# Patient Record
Sex: MALE | Race: WHITE | NOT HISPANIC OR LATINO | Employment: OTHER | URBAN - METROPOLITAN AREA
[De-identification: names, ages, dates, MRNs, and addresses within clinical notes are randomized per-mention and may not be internally consistent; named-entity substitution may affect disease eponyms.]

---

## 2023-03-26 DIAGNOSIS — E11.9 TYPE 2 DIABETES MELLITUS WITHOUT COMPLICATION, WITHOUT LONG-TERM CURRENT USE OF INSULIN (MULTI): Primary | ICD-10-CM

## 2023-03-27 RX ORDER — DAPAGLIFLOZIN 10 MG/1
TABLET, FILM COATED ORAL
Qty: 30 TABLET | Refills: 7 | Status: SHIPPED | OUTPATIENT
Start: 2023-03-27 | End: 2023-11-14

## 2023-05-09 DIAGNOSIS — E11.65 UNCONTROLLED TYPE 2 DIABETES MELLITUS WITH HYPERGLYCEMIA (MULTI): ICD-10-CM

## 2023-05-09 RX ORDER — METFORMIN HYDROCHLORIDE 500 MG/1
TABLET ORAL
Qty: 180 TABLET | Refills: 2 | Status: SHIPPED | OUTPATIENT
Start: 2023-05-09 | End: 2024-02-13 | Stop reason: SDUPTHER

## 2023-05-26 ENCOUNTER — APPOINTMENT (OUTPATIENT)
Dept: PRIMARY CARE | Facility: CLINIC | Age: 68
End: 2023-05-26
Payer: COMMERCIAL

## 2023-05-31 PROBLEM — R76.8 POSITIVE ANA (ANTINUCLEAR ANTIBODY): Status: ACTIVE | Noted: 2023-05-31

## 2023-05-31 PROBLEM — E78.2 MIXED HYPERLIPIDEMIA: Status: ACTIVE | Noted: 2023-05-31

## 2023-05-31 PROBLEM — I49.3 PVC (PREMATURE VENTRICULAR CONTRACTION): Status: ACTIVE | Noted: 2023-05-31

## 2023-05-31 RX ORDER — ASPIRIN 81 MG/1
81 TABLET ORAL DAILY
COMMUNITY

## 2023-05-31 RX ORDER — ROSUVASTATIN CALCIUM 5 MG/1
5 TABLET, COATED ORAL DAILY
COMMUNITY
End: 2023-08-28

## 2023-05-31 ASSESSMENT — ENCOUNTER SYMPTOMS
CHILLS: 0
FREQUENCY: 0
NAUSEA: 0
WHEEZING: 0
BRUISES/BLEEDS EASILY: 0
DIZZINESS: 0
POLYPHAGIA: 0
ARTHRALGIAS: 0
HEADACHES: 0
CONSTIPATION: 0
COUGH: 0
PALPITATIONS: 0
ABDOMINAL PAIN: 0
ADENOPATHY: 0
SHORTNESS OF BREATH: 0
FEVER: 0
FATIGUE: 0
BLOOD IN STOOL: 0
EYE REDNESS: 0
DYSPHORIC MOOD: 0
EYE DISCHARGE: 0
VOMITING: 0
DIARRHEA: 0
SORE THROAT: 0
POLYDIPSIA: 0
NERVOUS/ANXIOUS: 0
DYSURIA: 0
HEMATURIA: 0
RHINORRHEA: 0
MYALGIAS: 0

## 2023-05-31 NOTE — PROGRESS NOTES
Subjective   Patient ID: Reji Ingram is a 67 y.o. male who presents for medicarewellness (Patient is fasting/Does not see any outside  providers./Bp at home 117/73/Doesn't know last a1c/Doesn't know when last microalbumin was/Doesn't take sugar /Last eye doctor appointment 1 year ago/Last dentist appointment  a little over a year/Pt doesn't have pneumonia shot///).    This is a medicare wellness appt; please ask all medicare questions  Is pt fasting? yes  Does pt see any providers other than care team below: no  Azam flores    Bps at home 100s/70s  Last a1c 2/2023  Last microalbumin 2/2023  Sugars am-not checking  Sugars 2hrs pp-not checking  Last eye dr 1yr ago; sees dr who is an optometrist  If in the last 12mon narendra  Last dentist 1yr ago  Last foot check 2/24/23  Does pt want pneumonia shot?no  Poc a1c=7.2    No care team member to display    HPI  Last labs-2/2023 a1c 7.2, microalb nl, liver neg; 10/2022 sugar 214, lipid nl; 8/2021 psa nl  Due for labs- bmp, lipid, cbc, psa    Cholesterol   Date Value Ref Range Status   07/15/2022 220 (H) 0 - 199 mg/dL Final     Comment:     .      AGE      DESIRABLE   BORDERLINE HIGH   HIGH     0-19 Y     0 - 169       170 - 199     >/= 200    20-24 Y     0 - 189       190 - 224     >/= 225         >24 Y     0 - 199       200 - 239     >/= 240   **All ranges are based on fasting samples. Specific   therapeutic targets will vary based on patient-specific   cardiac risk.  .   Pediatric guidelines reference:Pediatrics 2011, 128(S5).   Adult guidelines reference: NCEP ATPIII Guidelines,     DANUTA 2001, 258:2486-97  .   Venipuncture immediately after or during the    administration of Metamizole may lead to falsely   low results. Testing should be performed immediately   prior to Metamizole dosing.     08/09/2021 204 (H) 0 - 199 mg/dL Final     Comment:     .      AGE      DESIRABLE   BORDERLINE HIGH   HIGH     0-19 Y     0 - 169       170 - 199     >/= 200    20-24 Y     0 - 189        190 - 224     >/= 225         >24 Y     0 - 199       200 - 239     >/= 240   **All ranges are based on fasting samples. Specific   therapeutic targets will vary based on patient-specific   cardiac risk.  .   Pediatric guidelines reference:Pediatrics 2011, 128(S5).   Adult guidelines reference: NCEP ATPIII Guidelines,     DANUTA 2001, 258:2486-97  .   Venipuncture immediately after or during the    administration of Metamizole may lead to falsely   low results. Testing should be performed immediately   prior to Metamizole dosing.       Triglycerides   Date Value Ref Range Status   07/15/2022 146 0 - 149 mg/dL Final     Comment:     .      AGE      DESIRABLE   BORDERLINE HIGH   HIGH     VERY HIGH   0 D-90 D    19 - 174         ----         ----        ----  91 D- 9 Y     0 -  74        75 -  99     >/= 100      ----    10-19 Y     0 -  89        90 - 129     >/= 130      ----    20-24 Y     0 - 114       115 - 149     >/= 150      ----         >24 Y     0 - 149       150 - 199    200- 499    >/= 500  .   Venipuncture immediately after or during the    administration of Metamizole may lead to falsely   low results. Testing should be performed immediately   prior to Metamizole dosing.     08/09/2021 85 0 - 149 mg/dL Final     Comment:     .      AGE      DESIRABLE   BORDERLINE HIGH   HIGH     VERY HIGH   0 D-90 D    19 - 174         ----         ----        ----  91 D- 9 Y     0 -  74        75 -  99     >/= 100      ----    10-19 Y     0 -  89        90 - 129     >/= 130      ----    20-24 Y     0 - 114       115 - 149     >/= 150      ----         >24 Y     0 - 149       150 - 199    200- 499    >/= 500  .   Venipuncture immediately after or during the    administration of Metamizole may lead to falsely   low results. Testing should be performed immediately   prior to Metamizole dosing.       HDL   Date Value Ref Range Status   07/15/2022 45.6 mg/dL Final     Comment:     .      AGE      VERY LOW   LOW     NORMAL     HIGH       0-19 Y       < 35   < 40     40-45     ----    20-24 Y       ----   < 40       >45     ----      >24 Y       ----   < 40     40-60      >60  .     08/09/2021 42.5 mg/dL Final     Comment:     .      AGE      VERY LOW   LOW     NORMAL    HIGH       0-19 Y       < 35   < 40     40-45     ----    20-24 Y       ----   < 40       >45     ----      >24 Y       ----   < 40     40-60      >60  .       No results found for: LDL  No results found for: TSH  No components found for: A1C  No components found for: POCA1C  No results found for: ALBUR  No components found for: POCALBUR      Other concerns: none      ER/urgicare visits in the last year- none  Hospitalizations in the last year- none      last colonoscopy/cologuard/FIT (45-75) 3/17/22- due 3/2032  FH colon ca-none  FH prostate ca-none      Exercise- running x4mon solid-35-40 miles a month  Diet-3 meals but busy with 2 jobs  Has Flowity   Body mass index is 25.38 kg/m².    last eye dr appt- glasses; 1yr   No vision issues    last dental appt- 1yr    BMs-regular  Sleep-able to fall asleep and stay asleep; no snoring or apnea  no cp, swelling, sob, abd pain, n/v/d/c, blood in stool or black stools  STI testing including hiv (age 15-65) and hep c screening (18-79)-declines  PSA 50-85 with labs      Immunization History   Administered Date(s) Administered    Moderna SARS-CoV-2 Vaccination 02/16/2021, 03/15/2021, 10/25/2021, 04/08/2022    Tdap 08/09/2021    Zoster, Recombinant 08/09/2021, 11/20/2021       fractures in lifetime-rt hand, rt middle finger aug 2022, lf thumb, lf knee 1984  Pt is a catcher  FH hip/spine/wrist/humerus fx in mom, dad or sibs-none    FH heart attack, heart surgery-none  FH stroke-none    Stress echo test nl 9/2021     The 10-year ASCVD risk score (Roxanna AN, et al., 2019) is: 31.3%    Values used to calculate the score:      Age: 67 years      Sex: Male      Is Non- : No      Diabetic: Yes      Tobacco  smoker: No      Systolic Blood Pressure: 140 mmHg      Is BP treated: No      HDL Cholesterol: 45.6 mg/dL      Total Cholesterol: 220 mg/dL  Coronary Artery Calcium score:  This test is recommended for men 45 or older and women 55 or older without a history of heart disease and have 1 risk factor (high LDL cholesterol, low HDL cholesterol, high blood pressure, smoker (current or past), type 2 diabetes, IBD, lupus, RA, ankylosing spondylitis, psoriasis or family history of  heart disease <55yrs in dad, brother or child or <65yrs in mom, sister, or child.)       Review of Systems   Constitutional:  Negative for chills, fatigue and fever.   HENT:  Negative for congestion, ear pain, postnasal drip, rhinorrhea and sore throat.    Eyes:  Negative for discharge, redness and visual disturbance.   Respiratory:  Negative for cough, shortness of breath and wheezing.    Cardiovascular:  Negative for chest pain, palpitations and leg swelling.   Gastrointestinal:  Negative for abdominal pain, blood in stool, constipation, diarrhea, nausea and vomiting.   Endocrine: Negative for cold intolerance, polydipsia, polyphagia and polyuria.   Genitourinary:  Negative for dysuria, frequency, genital sores, hematuria, penile pain, testicular pain and urgency.   Musculoskeletal:  Negative for arthralgias and myalgias.   Skin:  Negative for rash.   Neurological:  Negative for dizziness, syncope and headaches.   Hematological:  Negative for adenopathy. Does not bruise/bleed easily.   Psychiatric/Behavioral:  Negative for dysphoric mood. The patient is not nervous/anxious.        Objective   Visit Vitals  /78   Pulse (!) 47   Temp 36.3 °C (97.4 °F)      BP Readings from Last 3 Encounters:   06/01/23 140/78   02/24/23 138/82   01/20/23 (!) 152/106     Wt Readings from Last 3 Encounters:   06/01/23 82.6 kg (182 lb)   02/24/23 84.4 kg (186 lb)   01/20/23 83 kg (183 lb)           Physical Exam  Vitals reviewed.   Constitutional:        General: He is not in acute distress.     Appearance: Normal appearance. He is not ill-appearing.   HENT:      Head: Normocephalic.      Right Ear: Tympanic membrane, ear canal and external ear normal.      Left Ear: Tympanic membrane, ear canal and external ear normal.      Nose: Nose normal.      Mouth/Throat:      Mouth: Mucous membranes are moist.      Pharynx: Oropharynx is clear. No oropharyngeal exudate or posterior oropharyngeal erythema.   Eyes:      Extraocular Movements: Extraocular movements intact.      Conjunctiva/sclera: Conjunctivae normal.      Pupils: Pupils are equal, round, and reactive to light.   Cardiovascular:      Rate and Rhythm: Normal rate and regular rhythm.      Heart sounds: Normal heart sounds. No murmur heard.  Pulmonary:      Effort: Pulmonary effort is normal. No respiratory distress.      Breath sounds: Normal breath sounds. No wheezing, rhonchi or rales.   Abdominal:      General: Bowel sounds are normal. There is no distension.      Palpations: Abdomen is soft. There is no mass.      Tenderness: There is no abdominal tenderness.   Musculoskeletal:         General: No swelling or tenderness. Normal range of motion.      Cervical back: Normal range of motion and neck supple. No tenderness.      Right lower leg: No edema.      Left lower leg: No edema.   Lymphadenopathy:      Cervical: No cervical adenopathy.   Skin:     General: Skin is warm.      Findings: No rash.   Neurological:      General: No focal deficit present.      Mental Status: He is alert and oriented to person, place, and time.      Cranial Nerves: No cranial nerve deficit.   Psychiatric:         Mood and Affect: Mood normal.         Behavior: Behavior normal.         Assessment/Plan   Diagnoses and all orders for this visit:  Routine general medical examination at a health care facility  Screening for malignant neoplasm of prostate  -     Prostate Specific Antigen, Screen; Future  Uncontrolled type 2 diabetes  mellitus with hyperglycemia (CMS/[facility])  -     Basic metabolic panel; Future  -     CBC and Auto Differential; Future  -     POCT glycosylated hemoglobin (Hb A1C) manually resulted  Mixed hyperlipidemia  -     CBC and Auto Differential; Future  -     Lipid Panel; Future  BMI 25.0-25.9,adult

## 2023-06-01 ENCOUNTER — OFFICE VISIT (OUTPATIENT)
Dept: PRIMARY CARE | Facility: CLINIC | Age: 68
End: 2023-06-01
Payer: COMMERCIAL

## 2023-06-01 VITALS
TEMPERATURE: 97.4 F | BODY MASS INDEX: 25.38 KG/M2 | HEART RATE: 47 BPM | WEIGHT: 182 LBS | SYSTOLIC BLOOD PRESSURE: 120 MMHG | DIASTOLIC BLOOD PRESSURE: 78 MMHG

## 2023-06-01 DIAGNOSIS — Z13.89 SCREENING FOR MULTIPLE CONDITIONS: ICD-10-CM

## 2023-06-01 DIAGNOSIS — Z00.00 ROUTINE GENERAL MEDICAL EXAMINATION AT A HEALTH CARE FACILITY: Primary | ICD-10-CM

## 2023-06-01 DIAGNOSIS — E11.65 UNCONTROLLED TYPE 2 DIABETES MELLITUS WITH HYPERGLYCEMIA (MULTI): ICD-10-CM

## 2023-06-01 DIAGNOSIS — E78.2 MIXED HYPERLIPIDEMIA: ICD-10-CM

## 2023-06-01 DIAGNOSIS — Z12.5 SCREENING FOR MALIGNANT NEOPLASM OF PROSTATE: ICD-10-CM

## 2023-06-01 LAB
NON-UH HIE BASO COUNT: 0.04 X1000 (ref 0–0.2)
NON-UH HIE BASOS %: 0.7 %
NON-UH HIE BUN/CREAT RATIO: 30
NON-UH HIE BUN: 30 MG/DL (ref 9–23)
NON-UH HIE CALCIUM: 9.3 MG/DL (ref 8.7–10.4)
NON-UH HIE CALCULATED LDL CHOLESTEROL: 72 MG/DL (ref 60–130)
NON-UH HIE CALCULATED OSMOLALITY: 282 MOSM/KG (ref 275–295)
NON-UH HIE CHLORIDE: 105 MMOL/L (ref 98–107)
NON-UH HIE CHOLESTEROL: 136 MG/DL (ref 100–200)
NON-UH HIE CO2, VENOUS: 26 MMOL/L (ref 20–31)
NON-UH HIE CREATININE: 1 MG/DL (ref 0.6–1.1)
NON-UH HIE DIFF?: NO
NON-UH HIE EOS COUNT: 0.25 X1000 (ref 0–0.5)
NON-UH HIE EOSIN %: 4.7 %
NON-UH HIE GFR AA: >60
NON-UH HIE GLOMERULAR FILTRATION RATE: >60 ML/MIN/1.73M?
NON-UH HIE GLUCOSE: 105 MG/DL (ref 74–106)
NON-UH HIE HCT: 44.8 % (ref 41–52)
NON-UH HIE HDL CHOLESTEROL: 57 MG/DL (ref 40–60)
NON-UH HIE HGB: 15.4 G/DL (ref 13.5–17.5)
NON-UH HIE INSTR WBC: 5.3
NON-UH HIE K: 3.9 MMOL/L (ref 3.5–5.1)
NON-UH HIE LYMPH %: 34.1 %
NON-UH HIE LYMPH COUNT: 1.79 X1000 (ref 1.2–4.8)
NON-UH HIE MCH: 31.1 PG (ref 27–34)
NON-UH HIE MCHC: 34.3 G/DL (ref 32–37)
NON-UH HIE MCV: 90.8 FL (ref 80–100)
NON-UH HIE MONO %: 8.5 %
NON-UH HIE MONO COUNT: 0.45 X1000 (ref 0.1–1)
NON-UH HIE MPV: 8.8 FL (ref 7.4–10.4)
NON-UH HIE NA: 138 MMOL/L (ref 135–145)
NON-UH HIE NEUTROPHIL %: 52 %
NON-UH HIE NEUTROPHIL COUNT (ANC): 2.74 X1000 (ref 1.4–8.8)
NON-UH HIE NUCLEATED RBC: 0 /100WBC
NON-UH HIE PLATELET: 202 X10 (ref 150–450)
NON-UH HIE PROSTATIC SPECIFIC AG SCREEN: 1 NG/ML (ref 0–4)
NON-UH HIE RBC: 4.93 X10 (ref 4.7–6.1)
NON-UH HIE RDW: 12.4 % (ref 11.5–14.5)
NON-UH HIE TOTAL CHOL/HDL CHOL RATIO: 2.4
NON-UH HIE TRIGLYCERIDES: 35 MG/DL (ref 30–150)
NON-UH HIE WBC: 5.3 X10 (ref 4.5–11)
POC HEMOGLOBIN A1C: 7.2 % (ref 4.2–6.5)

## 2023-06-01 PROCEDURE — 1159F MED LIST DOCD IN RCRD: CPT | Performed by: NURSE PRACTITIONER

## 2023-06-01 PROCEDURE — 83036 HEMOGLOBIN GLYCOSYLATED A1C: CPT | Performed by: NURSE PRACTITIONER

## 2023-06-01 PROCEDURE — G0439 PPPS, SUBSEQ VISIT: HCPCS | Performed by: NURSE PRACTITIONER

## 2023-06-01 PROCEDURE — 1170F FXNL STATUS ASSESSED: CPT | Performed by: NURSE PRACTITIONER

## 2023-06-01 PROCEDURE — 1160F RVW MEDS BY RX/DR IN RCRD: CPT | Performed by: NURSE PRACTITIONER

## 2023-06-01 PROCEDURE — 3074F SYST BP LT 130 MM HG: CPT | Performed by: NURSE PRACTITIONER

## 2023-06-01 PROCEDURE — 3078F DIAST BP <80 MM HG: CPT | Performed by: NURSE PRACTITIONER

## 2023-06-01 PROCEDURE — G0444 DEPRESSION SCREEN ANNUAL: HCPCS | Performed by: NURSE PRACTITIONER

## 2023-06-01 PROCEDURE — 3008F BODY MASS INDEX DOCD: CPT | Performed by: NURSE PRACTITIONER

## 2023-06-01 PROCEDURE — 1036F TOBACCO NON-USER: CPT | Performed by: NURSE PRACTITIONER

## 2023-06-01 ASSESSMENT — ACTIVITIES OF DAILY LIVING (ADL)
GROCERY_SHOPPING: INDEPENDENT
MANAGING_FINANCES: INDEPENDENT
DRESSING: INDEPENDENT
TAKING_MEDICATION: INDEPENDENT
BATHING: INDEPENDENT
DOING_HOUSEWORK: INDEPENDENT

## 2023-06-01 NOTE — PATIENT INSTRUCTIONS
See eye dr  See dentist    A1C today=7.2  This is the 3 month average of your sugars.  You are in the normal range which is 5.6 or less.      Handouts given to pt:  physical handout      I recommend signing up for MyChart.    Labs:    You can use the lab in our building when fasting. The hrs are: Monday-Thursday, 7 a.m. - 6 p.m., Friday, 7 a.m. - 5 p.m., Saturday 8 a.m. - 12 noon.   No appt needed, BUT YOU DO NEED THE PAPER ORDER.    Fasting is no food, drink, gum or mints other than water for 12 hrs.   Results will be back in 2-3 business days for most labs. It is always recommended for any orders (labs, xrays, ultrasounds,MRI, ct scan, procedures etc) to check with your insurance provider for expected costs or expenses to you.       You will get your results via phone from my medical assistant if you do not have MyChart.  OR  You will get your results via [employer]    If a result is urgent, I will call to speak to you.    Vaccines:      ---- [id number]    General recommendations:  Exercise-cardio 4-5d/wk 30min each day  Diet-Breakfast-toast (my favorite Natalee Hernandez Delighful Multigrain or Leo's Killer Bread Good Seed thin-sliced)/bagel/English muffin-whole wheat flour as a 1st ingredient or cereal/oatmeal/granola bar-fiber 4g or more or protein like eggs or peanut butter; optional veggies  Lunch-protein, 1/2c carb or 2 slices bread, veg 1c  Dinner-protein, fist sized carb, veg 1c  Fruit 2 a day  Dairy 2 a day-milk, soy milk, almond milk, cheese, yogurt, cottage cheese  Snacks-Protein-hard boiled egg, nuts (walnuts/almonds/pecans/pistachios 1/4c), hummus, beef/deer jerky or meat sticks; vegetable, fruit, dairy-milk(1%, skim, almond, soy)/cheese (not a lot of cheddar)/yogurt (Greek is best-my favorite Dannon Fruit on the Bottom Greek)/cottage cheese 2%; triscuits/ popcorn/wheat thins have a lot of fiber; follow serving size on bag/box/container  increase water  Limit alcohol to 1 drink per day for women and 2  drinks per day for men (1 drink=12oz beer or 5oz wine or 1 1/2oz liquor)  Calcium: 500mg 1 twice a day if age 50 and younger and 600mg 1 twice a day if over age 50 (calcium citrate can be taken without food)  Vitamin D: 800-5000 IU/day  Limit salt to <2300mg a day if age 50 and under and <1500mg a day if over age 50/have high bp or diabetes or kidney disease  Recommend folate for childbearing age women 0.4mg per day (can be found in a multivitamin)  Recommend 18mg/dL of iron a day if age 50 and under and 8mg/dL a day if over age 50; take on an empty stomach at bedtime  Use sunscreen   Wear seatbelt  Recommend safe sex practices: using condoms everytime you have sex, discuss with a new partner about their past partners/history of STDs/drug use, avoid drinking alcohol or using drugs as this increases the chance that you will participate in high-risk sex, for oral sex help protect your mouth by having your partner use a condom (male or female), women should not douche after sex, be aware of your partner's body and your body-look for signs of a sore, blister, rash, or discharge, and have regular exams and periodic tests for STDs.  No distracted driving  No driving when under influence of substances  Wear a seatbelt  Eye dr every 1-2yrs  Dentist every 6-12 mon  Tetanus shot every 10yrs  Recommend flu vaccine in the fall  Appt in 6mon for follow up on diabetes and cholesterol and 1 year for physical      I will communicate with you via [employer] regarding messages and results. If you need help with this, you can call the support line at [phone].    IT WAS A PLEASURE TO SEE YOU TODAY. THANK YOU FOR CHOOSING US FOR YOUR HEALTHCARE NEEDS.

## 2023-08-28 ENCOUNTER — TELEPHONE (OUTPATIENT)
Dept: PRIMARY CARE | Facility: CLINIC | Age: 68
End: 2023-08-28
Payer: COMMERCIAL

## 2023-08-28 DIAGNOSIS — E78.2 MIXED HYPERLIPIDEMIA: Primary | ICD-10-CM

## 2023-08-28 RX ORDER — ROSUVASTATIN CALCIUM 5 MG/1
5 TABLET, COATED ORAL NIGHTLY
Qty: 90 TABLET | Refills: 1 | Status: SHIPPED | OUTPATIENT
Start: 2023-08-28 | End: 2024-02-13 | Stop reason: SDUPTHER

## 2023-11-14 DIAGNOSIS — E11.9 TYPE 2 DIABETES MELLITUS WITHOUT COMPLICATION, WITHOUT LONG-TERM CURRENT USE OF INSULIN (MULTI): ICD-10-CM

## 2023-11-14 RX ORDER — DAPAGLIFLOZIN 10 MG/1
TABLET, FILM COATED ORAL
Qty: 30 TABLET | Refills: 1 | Status: SHIPPED | OUTPATIENT
Start: 2023-11-14 | End: 2024-01-11

## 2024-01-11 ENCOUNTER — TELEPHONE (OUTPATIENT)
Dept: PRIMARY CARE | Facility: CLINIC | Age: 69
End: 2024-01-11
Payer: MEDICARE

## 2024-01-11 DIAGNOSIS — E11.9 TYPE 2 DIABETES MELLITUS WITHOUT COMPLICATION, WITHOUT LONG-TERM CURRENT USE OF INSULIN (MULTI): ICD-10-CM

## 2024-01-11 RX ORDER — DAPAGLIFLOZIN 10 MG/1
TABLET, FILM COATED ORAL
Qty: 30 TABLET | Refills: 0 | Status: SHIPPED | OUTPATIENT
Start: 2024-01-11 | End: 2024-02-13 | Stop reason: SDUPTHER

## 2024-01-11 NOTE — LETTER
January 29, 2024     Reji Ingram  [address] [place] [address]    Patient: Reji Ingram   YOB: 1955   Date of Visit: 1/11/2024         Dear Reji,    We have made several attempts to contact you by phone regarding making a follow up appt.   Please call to our office at [phone] option 1 so that we can get your scheduled. Your insurance should cover this except for a copay since you have diabetes.      Sincerely,          Leonora Pandya, APRN-CNP

## 2024-01-12 NOTE — TELEPHONE ENCOUNTER
Patient is not sure if his insurance will cover blood work more than once a year. He will check and get back to us.

## 2024-01-22 NOTE — TELEPHONE ENCOUNTER
Breann,   It should. Due to diabetes, he needs labs every 3-6mon.  Pls call pt to see if he checked.

## 2024-02-08 ENCOUNTER — TELEPHONE (OUTPATIENT)
Dept: PRIMARY CARE | Facility: CLINIC | Age: 69
End: 2024-02-08
Payer: MEDICARE

## 2024-02-08 DIAGNOSIS — Z79.899 MEDICATION MANAGEMENT: ICD-10-CM

## 2024-02-08 DIAGNOSIS — E78.2 MIXED HYPERLIPIDEMIA: Primary | ICD-10-CM

## 2024-02-08 DIAGNOSIS — E11.65 UNCONTROLLED TYPE 2 DIABETES MELLITUS WITH HYPERGLYCEMIA (MULTI): ICD-10-CM

## 2024-02-13 DIAGNOSIS — E11.65 UNCONTROLLED TYPE 2 DIABETES MELLITUS WITH HYPERGLYCEMIA (MULTI): ICD-10-CM

## 2024-02-13 DIAGNOSIS — E11.9 TYPE 2 DIABETES MELLITUS WITHOUT COMPLICATION, WITHOUT LONG-TERM CURRENT USE OF INSULIN (MULTI): ICD-10-CM

## 2024-02-13 DIAGNOSIS — E78.2 MIXED HYPERLIPIDEMIA: ICD-10-CM

## 2024-02-13 RX ORDER — DAPAGLIFLOZIN 10 MG/1
TABLET, FILM COATED ORAL
Qty: 90 TABLET | Refills: 2 | Status: SHIPPED | OUTPATIENT
Start: 2024-02-13 | End: 2024-02-20 | Stop reason: SDUPTHER

## 2024-02-13 RX ORDER — METFORMIN HYDROCHLORIDE 500 MG/1
500 TABLET ORAL 2 TIMES DAILY
Qty: 180 TABLET | Refills: 2 | Status: SHIPPED | OUTPATIENT
Start: 2024-02-13

## 2024-02-13 RX ORDER — ROSUVASTATIN CALCIUM 5 MG/1
5 TABLET, COATED ORAL NIGHTLY
Qty: 90 TABLET | Refills: 2 | Status: SHIPPED | OUTPATIENT
Start: 2024-02-13

## 2024-02-20 DIAGNOSIS — E11.9 TYPE 2 DIABETES MELLITUS WITHOUT COMPLICATION, WITHOUT LONG-TERM CURRENT USE OF INSULIN (MULTI): ICD-10-CM

## 2024-02-20 RX ORDER — DAPAGLIFLOZIN 10 MG/1
TABLET, FILM COATED ORAL
Qty: 90 TABLET | Refills: 2 | Status: SHIPPED | OUTPATIENT
Start: 2024-02-20

## 2024-02-21 ENCOUNTER — TELEPHONE (OUTPATIENT)
Dept: PRIMARY CARE | Facility: CLINIC | Age: 69
End: 2024-02-21
Payer: MEDICARE

## 2024-02-21 NOTE — TELEPHONE ENCOUNTER
Pls call the [facility] number below with the reference number to find out what drug interaction with crestor they are talking about.   I think they are talking about his allergy to atorvastatin.   Let them know pt is tolerating crestor fine since 11/2022.  Let them know to fill the med.      Reji Ingram  You32 minutes ago (1:57 PM)       They say the number to contact them is [phone] reference number [id number]       Reji Ingram  You34 minutes ago (1:55 PM)       They say they sent you a fax on 2/20 regarding Rosuvastatin possible drug interactions.

## 2024-03-08 LAB
NON-UH HIE A/G RATIO: 1.2
NON-UH HIE ALB: 4 G/DL (ref 3.4–5)
NON-UH HIE ALK PHOS: 78 UNIT/L (ref 45–117)
NON-UH HIE BILIRUBIN, TOTAL: 1.2 MG/DL (ref 0.3–1.2)
NON-UH HIE BUN/CREAT RATIO: 27
NON-UH HIE BUN: 27 MG/DL (ref 9–23)
NON-UH HIE CALCIUM: 9.4 MG/DL (ref 8.7–10.4)
NON-UH HIE CALCULATED LDL CHOLESTEROL: 116 MG/DL (ref 60–130)
NON-UH HIE CALCULATED OSMOLALITY: 291 MOSM/KG (ref 275–295)
NON-UH HIE CHLORIDE: 109 MMOL/L (ref 98–107)
NON-UH HIE CHOLESTEROL: 185 MG/DL (ref 100–200)
NON-UH HIE CO2, VENOUS: 28 MMOL/L (ref 20–31)
NON-UH HIE CREATININE: 1 MG/DL (ref 0.6–1.1)
NON-UH HIE GFR AA: >60
NON-UH HIE GLOBULIN: 3.2 G/DL
NON-UH HIE GLOMERULAR FILTRATION RATE: >60 ML/MIN/1.73M?
NON-UH HIE GLUCOSE: 148 MG/DL (ref 74–106)
NON-UH HIE GOT: 24 UNIT/L (ref 15–37)
NON-UH HIE GPT: 27 UNIT/L (ref 10–49)
NON-UH HIE HDL CHOLESTEROL: 56 MG/DL (ref 40–60)
NON-UH HIE HGB A1C: 6.3 %
NON-UH HIE K: 4.3 MMOL/L (ref 3.5–5.1)
NON-UH HIE NA: 142 MMOL/L (ref 135–145)
NON-UH HIE TOTAL CHOL/HDL CHOL RATIO: 3.3
NON-UH HIE TOTAL PROTEIN: 7.2 G/DL (ref 5.7–8.2)
NON-UH HIE TRIGLYCERIDES: 65 MG/DL (ref 30–150)
NON-UH HIE VITAMIN B12: 637 PG/ML (ref 211–911)

## 2024-03-12 ENCOUNTER — OFFICE VISIT (OUTPATIENT)
Dept: PRIMARY CARE | Facility: CLINIC | Age: 69
End: 2024-03-12
Payer: MEDICARE

## 2024-03-12 VITALS
WEIGHT: 183.6 LBS | OXYGEN SATURATION: 98 % | BODY MASS INDEX: 25.7 KG/M2 | HEIGHT: 71 IN | HEART RATE: 64 BPM | TEMPERATURE: 96.9 F | SYSTOLIC BLOOD PRESSURE: 125 MMHG | DIASTOLIC BLOOD PRESSURE: 79 MMHG

## 2024-03-12 DIAGNOSIS — Z79.899 MEDICATION MANAGEMENT: ICD-10-CM

## 2024-03-12 DIAGNOSIS — E11.9 CONTROLLED TYPE 2 DIABETES MELLITUS WITHOUT COMPLICATION, WITHOUT LONG-TERM CURRENT USE OF INSULIN (MULTI): Primary | ICD-10-CM

## 2024-03-12 DIAGNOSIS — E78.2 MIXED HYPERLIPIDEMIA: ICD-10-CM

## 2024-03-12 DIAGNOSIS — Z12.5 SCREENING FOR MALIGNANT NEOPLASM OF PROSTATE: ICD-10-CM

## 2024-03-12 PROBLEM — E11.65 UNCONTROLLED TYPE 2 DIABETES MELLITUS WITH HYPERGLYCEMIA (MULTI): Status: RESOLVED | Noted: 2023-05-09 | Resolved: 2024-03-12

## 2024-03-12 PROCEDURE — 1125F AMNT PAIN NOTED PAIN PRSNT: CPT | Performed by: NURSE PRACTITIONER

## 2024-03-12 PROCEDURE — 99214 OFFICE O/P EST MOD 30 MIN: CPT | Performed by: NURSE PRACTITIONER

## 2024-03-12 PROCEDURE — 3074F SYST BP LT 130 MM HG: CPT | Performed by: NURSE PRACTITIONER

## 2024-03-12 PROCEDURE — 3008F BODY MASS INDEX DOCD: CPT | Performed by: NURSE PRACTITIONER

## 2024-03-12 PROCEDURE — 3078F DIAST BP <80 MM HG: CPT | Performed by: NURSE PRACTITIONER

## 2024-03-12 PROCEDURE — 1159F MED LIST DOCD IN RCRD: CPT | Performed by: NURSE PRACTITIONER

## 2024-03-12 PROCEDURE — 1124F ACP DISCUSS-NO DSCNMKR DOCD: CPT | Performed by: NURSE PRACTITIONER

## 2024-03-12 PROCEDURE — 1036F TOBACCO NON-USER: CPT | Performed by: NURSE PRACTITIONER

## 2024-03-12 ASSESSMENT — ENCOUNTER SYMPTOMS
CONSTITUTIONAL NEGATIVE: 1
SHORTNESS OF BREATH: 0
WHEEZING: 0

## 2024-03-12 ASSESSMENT — PATIENT HEALTH QUESTIONNAIRE - PHQ9
2. FEELING DOWN, DEPRESSED OR HOPELESS: NOT AT ALL
SUM OF ALL RESPONSES TO PHQ9 QUESTIONS 1 AND 2: 0
1. LITTLE INTEREST OR PLEASURE IN DOING THINGS: NOT AT ALL

## 2024-03-12 NOTE — PROGRESS NOTES
Subjective   Patient ID: Reji Ingram is a 68 y.o. male who presents for Follow-up.  Last physical: 6/1/23    ACP  Is pt fasting?  no   B12 lab-3/2024  On a statin? yes  Bps at home 100s/70s  Sugars am-not checking  Sugars 2hrs pp-not checking  Last eye dr  last fall his daughter- debbie augustine   If in the last 12mon narendra  Last dentist scheduled end of month   Last foot check today  Does pt want pneumonia shot?no    Any other questions or concerns that pt wants to discuss today? no    Monofilament out for feet check-SHOES OFF-done          HPI  Last labs-3/2024 kidney function, liver function and electrolytes in the CMP (comprehensive metabolic panel) were normal.  Sugar high at 148. Goal <100. Decr carbs and sugars. Pt has diabetes  Hdl and trigs normal.  Ldl high at 116. Goal <100. Decr fats and incr fiber  B12 normal  A1C 6.3  This is the 3 month average of your sugars.  Goal <7.0  6/2023 A1c 7.2,  microalb nl; kidney function, liver function and electrolytes in the CMP (comprehensive metabolic panel) were normal.  Sugar high at 105. Goal <100. Decr carbs and sugars. Pt has known diabetes. This has come down from 214 fasting last time.  All cholesterol labs normal.  PSA (prostate blood test) is normal.  CBC (complete blood count) was normal which looks at infection and anemia markers.  2/2023 a1c 7.2, microalb nl, liver neg; 10/2022 sugar 214, lipid nl; 8/2021 psa nl   Due for labs- none    Cholesterol   Date Value Ref Range Status   07/15/2022 220 (H) 0 - 199 mg/dL Final     Comment:     .      AGE      DESIRABLE   BORDERLINE HIGH   HIGH     0-19 Y     0 - 169       170 - 199     >/= 200    20-24 Y     0 - 189       190 - 224     >/= 225         >24 Y     0 - 199       200 - 239     >/= 240   **All ranges are based on fasting samples. Specific   therapeutic targets will vary based on patient-specific   cardiac risk.  .   Pediatric guidelines reference:Pediatrics 2011, 128(S5).   Adult guidelines reference: NCEP  ATPIII Guidelines,     DANUTA 2001, 258:2486-97  .   Venipuncture immediately after or during the    administration of Metamizole may lead to falsely   low results. Testing should be performed immediately   prior to Metamizole dosing.     08/09/2021 204 (H) 0 - 199 mg/dL Final     Comment:     .      AGE      DESIRABLE   BORDERLINE HIGH   HIGH     0-19 Y     0 - 169       170 - 199     >/= 200    20-24 Y     0 - 189       190 - 224     >/= 225         >24 Y     0 - 199       200 - 239     >/= 240   **All ranges are based on fasting samples. Specific   therapeutic targets will vary based on patient-specific   cardiac risk.  .   Pediatric guidelines reference:Pediatrics 2011, 128(S5).   Adult guidelines reference: NCEP ATPIII Guidelines,     DANUTA 2001, 258:2486-97  .   Venipuncture immediately after or during the    administration of Metamizole may lead to falsely   low results. Testing should be performed immediately   prior to Metamizole dosing.       Triglycerides   Date Value Ref Range Status   07/15/2022 146 0 - 149 mg/dL Final     Comment:     .      AGE      DESIRABLE   BORDERLINE HIGH   HIGH     VERY HIGH   0 D-90 D    19 - 174         ----         ----        ----  91 D- 9 Y     0 -  74        75 -  99     >/= 100      ----    10-19 Y     0 -  89        90 - 129     >/= 130      ----    20-24 Y     0 - 114       115 - 149     >/= 150      ----         >24 Y     0 - 149       150 - 199    200- 499    >/= 500  .   Venipuncture immediately after or during the    administration of Metamizole may lead to falsely   low results. Testing should be performed immediately   prior to Metamizole dosing.     08/09/2021 85 0 - 149 mg/dL Final     Comment:     .      AGE      DESIRABLE   BORDERLINE HIGH   HIGH     VERY HIGH   0 D-90 D    19 - 174         ----         ----        ----  91 D- 9 Y     0 -  74        75 -  99     >/= 100      ----    10-19 Y     0 -  89        90 - 129     >/= 130      ----    20-24 Y     0 - 114   "     115 - 149     >/= 150      ----         >24 Y     0 - 149       150 - 199    200- 499    >/= 500  .   Venipuncture immediately after or during the    administration of Metamizole may lead to falsely   low results. Testing should be performed immediately   prior to Metamizole dosing.       HDL   Date Value Ref Range Status   07/15/2022 45.6 mg/dL Final     Comment:     .      AGE      VERY LOW   LOW     NORMAL    HIGH       0-19 Y       < 35   < 40     40-45     ----    20-24 Y       ----   < 40       >45     ----      >24 Y       ----   < 40     40-60      >60  .     08/09/2021 42.5 mg/dL Final     Comment:     .      AGE      VERY LOW   LOW     NORMAL    HIGH       0-19 Y       < 35   < 40     40-45     ----    20-24 Y       ----   < 40       >45     ----      >24 Y       ----   < 40     40-60      >60  .       No results found for: \"LDL\"  No results found for: \"TSH\"  No results found for: \"A1C\"  No components found for: \"POCA1C\"  No results found for: \"ALBUR\"  No components found for: \"POCALBUR\"    Exercise-running 5mi+ a day 330 days in a row  Decr night work to 2 nights a wk. 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    Respiratory:  Negative for shortness of breath and wheezing.    Cardiovascular:  Negative for chest pain.       Objective   Visit Vitals  /85   Pulse 64   Temp 36.1 °C " (96.9 °F)      BP Readings from Last 3 Encounters:   03/12/24 149/85   06/01/23 120/78   02/24/23 138/82     Wt Readings from Last 3 Encounters:   03/12/24 83.3 kg (183 lb 9.6 oz)   06/01/23 82.6 kg (182 lb)   02/24/23 84.4 kg (186 lb)       The 10-year ASCVD risk score (Roxanna AN, et al., 2019) is: 36.2%    Values used to calculate the score:      Age: 68 years      Sex: Male      Is Non- : No      Diabetic: Yes      Tobacco smoker: No      Systolic Blood Pressure: 149 mmHg      Is BP treated: No      HDL Cholesterol: 45.6 mg/dL      Total Cholesterol: 220 mg/dL     Physical Exam  Constitutional:       General: He is not in acute distress.     Appearance: Normal appearance.   Feet:      Right foot:      Protective Sensation: 5 sites tested.  5 sites sensed.      Skin integrity: Skin integrity normal.      Left foot:      Protective Sensation: 5 sites tested.  5 sites sensed.      Skin integrity: Skin integrity normal.   Neurological:      Mental Status: He is alert.         Assessment/Plan   Diagnoses and all orders for this visit:  Controlled type 2 diabetes mellitus without complication, without long-term current use of insulin (CMS/[facility])  Other orders  -     Follow Up In Primary Care - Medicare Annual; Future

## 2024-03-12 NOTE — PATIENT INSTRUCTIONS
3/2024 kidney function, liver function and electrolytes in the CMP (comprehensive metabolic panel) were normal.  Sugar high at 148. Goal <100. Decr carbs and sugars. Pt has diabetes  Hdl and trigs normal.  Ldl high at 116. Goal <100. Decr fats and incr fiber  B12 normal  A1C 6.3  This is the 3 month average of your sugars.  Goal <7.0    Exercise-cardio 4-5d/wk 30min each day  Diet-Breakfast-toast (my favorite Natalee Hernandez Delightful multi-grain and Leo's Killer Bread thin-sliced Good Seed)/bagel/English muffin-whole wheat flour as a 1st ingredient or cereal/oatmeal/granola bar-fiber 4g or more; protein like eggs or peanut butter is Ok  Lunch-protein, veg 1c  Dinner-protein, veg 1c; if having potatoes, rice, noodles, or pasta-->fist sized; could try brown rice or whole wheat pasta or quinoa  Fruit 2 a day  Dairy 2 a day-milk, almond milk, soy milk, yogurt, cottage cheese, yogurt  Snacks-Protein-hard boiled egg, nuts (walnuts/almonds/pecans/pistachios 1/4c), hummus, beef/deer jerky; vegetable, fruit, dairy-milk(1%, skim, almond, soy)/cheese (not a lot of cheddar)/yogurt (Greek is best-my favorite Dannon Fruit on the Bottom Greek)/cottage cheese 2%; triscuits, popcorn have a lot of fiber; serving size  Water  Limit alcohol to 2 drinks per day (1 drink=12oz beer or 5oz wine or 1 1/2oz liquor)  appt in  6  months; be fasting      I will communicate with you via [employer] regarding messages and results. If you need help with this, you can call the support line at [phone].    IT WAS A PLEASURE TO SEE YOU TODAY. THANK YOU FOR CHOOSING US FOR YOUR HEALTHCARE NEEDS.

## 2024-06-18 ASSESSMENT — ENCOUNTER SYMPTOMS
WHEEZING: 0
DIZZINESS: 0
CONSTITUTIONAL NEGATIVE: 1
FEVER: 0
SHORTNESS OF BREATH: 0
HEADACHES: 0
CHILLS: 0

## 2024-06-19 ENCOUNTER — OFFICE VISIT (OUTPATIENT)
Dept: PRIMARY CARE | Facility: CLINIC | Age: 69
End: 2024-06-19
Payer: MEDICARE

## 2024-06-19 VITALS
HEIGHT: 71 IN | OXYGEN SATURATION: 98 % | DIASTOLIC BLOOD PRESSURE: 76 MMHG | HEART RATE: 62 BPM | SYSTOLIC BLOOD PRESSURE: 135 MMHG | TEMPERATURE: 97.1 F | BODY MASS INDEX: 26.12 KG/M2 | WEIGHT: 186.6 LBS

## 2024-06-19 DIAGNOSIS — R00.0 TACHYCARDIA: Primary | ICD-10-CM

## 2024-06-19 DIAGNOSIS — D72.819 LEUKOPENIA, UNSPECIFIED TYPE: Primary | ICD-10-CM

## 2024-06-19 DIAGNOSIS — Z12.5 SCREENING FOR MALIGNANT NEOPLASM OF PROSTATE: ICD-10-CM

## 2024-06-19 LAB
NON-UH HIE A/G RATIO: 1.2
NON-UH HIE ALB: 4.2 G/DL (ref 3.4–5)
NON-UH HIE ALK PHOS: 81 UNIT/L (ref 45–117)
NON-UH HIE BASO COUNT: 0.02 X1000 (ref 0–0.2)
NON-UH HIE BASOS %: 0.4 %
NON-UH HIE BILIRUBIN, TOTAL: 0.6 MG/DL (ref 0.3–1.2)
NON-UH HIE BUN/CREAT RATIO: 22
NON-UH HIE BUN: 22 MG/DL (ref 9–23)
NON-UH HIE CALCIUM: 9.4 MG/DL (ref 8.7–10.4)
NON-UH HIE CALCULATED OSMOLALITY: 289 MOSM/KG (ref 275–295)
NON-UH HIE CHLORIDE: 107 MMOL/L (ref 98–107)
NON-UH HIE CO2, VENOUS: 27 MMOL/L (ref 20–31)
NON-UH HIE CREATININE: 1 MG/DL (ref 0.6–1.1)
NON-UH HIE DIFF?: NO
NON-UH HIE EOS COUNT: 0.1 X1000 (ref 0–0.5)
NON-UH HIE EOSIN %: 2.2 %
NON-UH HIE GFR AA: >60
NON-UH HIE GLOBULIN: 3.4 G/DL
NON-UH HIE GLOMERULAR FILTRATION RATE: >60 ML/MIN/1.73M?
NON-UH HIE GLUCOSE: 179 MG/DL (ref 74–106)
NON-UH HIE GOT: 21 UNIT/L (ref 15–37)
NON-UH HIE GPT: 25 UNIT/L (ref 10–49)
NON-UH HIE HCT: 46.2 % (ref 41–52)
NON-UH HIE HGB: 15.9 G/DL (ref 13.5–17.5)
NON-UH HIE INSTR WBC: 4.4
NON-UH HIE K: 4.5 MMOL/L (ref 3.5–5.1)
NON-UH HIE LYMPH %: 29.7 %
NON-UH HIE LYMPH COUNT: 1.32 X1000 (ref 1.2–4.8)
NON-UH HIE MCH: 32.1 PG (ref 27–34)
NON-UH HIE MCHC: 34.4 G/DL (ref 32–37)
NON-UH HIE MCV: 93.3 FL (ref 80–100)
NON-UH HIE MONO %: 7.5 %
NON-UH HIE MONO COUNT: 0.34 X1000 (ref 0.1–1)
NON-UH HIE MPV: 9.1 FL (ref 7.4–10.4)
NON-UH HIE NA: 141 MMOL/L (ref 135–145)
NON-UH HIE NEUTROPHIL %: 60.1 %
NON-UH HIE NEUTROPHIL COUNT (ANC): 2.67 X1000 (ref 1.4–8.8)
NON-UH HIE NUCLEATED RBC: 0 /100WBC
NON-UH HIE PLATELET: 187 X10 (ref 150–450)
NON-UH HIE PROSTATIC SPECIFIC AG SCREEN: 1.2 NG/ML (ref 0–4)
NON-UH HIE RBC: 4.95 X10 (ref 4.7–6.1)
NON-UH HIE RDW: 12.6 % (ref 11.5–14.5)
NON-UH HIE TOTAL PROTEIN: 7.6 G/DL (ref 5.7–8.2)
NON-UH HIE TSH: 2.51 UIU/ML (ref 0.55–4.78)
NON-UH HIE WBC: 4.4 X10 (ref 4.5–11)

## 2024-06-19 PROCEDURE — 3075F SYST BP GE 130 - 139MM HG: CPT | Performed by: NURSE PRACTITIONER

## 2024-06-19 PROCEDURE — 1159F MED LIST DOCD IN RCRD: CPT | Performed by: NURSE PRACTITIONER

## 2024-06-19 PROCEDURE — 1160F RVW MEDS BY RX/DR IN RCRD: CPT | Performed by: NURSE PRACTITIONER

## 2024-06-19 PROCEDURE — 3078F DIAST BP <80 MM HG: CPT | Performed by: NURSE PRACTITIONER

## 2024-06-19 PROCEDURE — 99214 OFFICE O/P EST MOD 30 MIN: CPT | Performed by: NURSE PRACTITIONER

## 2024-06-19 PROCEDURE — 93000 ELECTROCARDIOGRAM COMPLETE: CPT | Performed by: NURSE PRACTITIONER

## 2024-06-19 PROCEDURE — 1036F TOBACCO NON-USER: CPT | Performed by: NURSE PRACTITIONER

## 2024-06-19 RX ORDER — GLUCOSAM/CHONDRO/HERB 149/HYAL 750-100 MG
1400 TABLET ORAL
COMMUNITY

## 2024-06-19 RX ORDER — MAGNESIUM GLYCINATE 100 MG
500 TABLET ORAL
COMMUNITY

## 2024-06-19 ASSESSMENT — PATIENT HEALTH QUESTIONNAIRE - PHQ9
2. FEELING DOWN, DEPRESSED OR HOPELESS: NOT AT ALL
1. LITTLE INTEREST OR PLEASURE IN DOING THINGS: NOT AT ALL
SUM OF ALL RESPONSES TO PHQ9 QUESTIONS 1 AND 2: 0

## 2024-06-19 NOTE — PATIENT INSTRUCTIONS
Labs today  EKG today  See cardio    Keep sept appt      I will communicate with you via ASSET4 regarding messages and results. If you need help with this, you can call the support line at [phone].    IT WAS A PLEASURE TO SEE YOU TODAY. THANK YOU FOR CHOOSING US FOR YOUR HEALTHCARE NEEDS.

## 2024-06-19 NOTE — PROGRESS NOTES
Subjective   Patient ID: Reji Ingram is a 68 y.o. male who presents for Irregular Heart Beat.  Last physical: has sept appt scheduled for cpe  Last labs-3/2024 kidney function, liver function and electrolytes in the CMP (comprehensive metabolic panel) were normal.  Sugar high at 148. Goal <100. Decr carbs and sugars. Pt has diabetes  Hdl and trigs normal.  Ldl high at 116. Goal <100. Decr fats and incr fiber  B12 normal  A1C 6.3  This is the 3 month average of your sugars.  Goal <7.0    Here for elevated HR since 5/12/24  Any cp, heart racing, skips/pauses, sob, wheezing, dizziness, frequent headaches or change in vision? When alarm goes off he does get anxious, he's unsure if he's experienced these things as he tends to ignore symptoms.   Any other questions or concerns that pt wants to discuss today? No     Pls do ekg    HPI  Since 5/12/2024 pt has been having periods of unexplained elevated heart rate.   Pt doesn't really feel any different when it happens but it seems to go up into the low 100's when doing nothing and stays there for as much as 3-4 hours then drops back down to his normal low 50's.   HR seems to go up when falling asleep and the elevated heart rate warning on the [employer] watch sounds an alert.   pt bought one of those [employer] EKG 6 lead devices and when heart rate goes up it confirms the rate is high and says pt might have atrial fibrilation.   pt just ran a 5K 5d ago and did very well. Pt has 14 solid months with a minimum of 10,000 steps per days usually a mixture of walking and running. VO2 is in the 90th percentile for my age. BMI is 25 .  blood pressure seems about my usual, high on first reading then only slightly elevated on repeat readings.   No cp, heart racing, skips/pauses, sob, wheezing, dizziness, frequent headaches or change in vision  Anxiety when alarm on watch goes off  Last 2d with no heart rate increases  Stopped fish oil yesterday    No FH heart issues    Normal stress test  2021    No care team member to display     Review of Systems   Constitutional: Negative.  Negative for chills and fever.   Eyes:  Negative for visual disturbance.   Respiratory:  Negative for shortness of breath and wheezing.    Cardiovascular:  Negative for chest pain.        Pos for tachycardia   Neurological:  Negative for dizziness and headaches.       Objective   Visit Vitals  /76   Pulse 62   Temp 36.2 °C (97.1 °F)      BP Readings from Last 3 Encounters:   06/19/24 135/76   03/12/24 125/79   06/01/23 120/78     Wt Readings from Last 3 Encounters:   06/19/24 84.6 kg (186 lb 9.6 oz)   03/12/24 83.3 kg (183 lb 9.6 oz)   06/01/23 82.6 kg (182 lb)       Physical Exam  Constitutional:       General: He is not in acute distress.     Appearance: Normal appearance.   Cardiovascular:      Rate and Rhythm: Rhythm irregular.      Heart sounds: Normal heart sounds. No murmur heard.     Comments: Tachycardia then 2-4 beats of regular heartrate  Pulmonary:      Effort: Pulmonary effort is normal.      Breath sounds: Normal breath sounds. No wheezing, rhonchi or rales.   Neurological:      Mental Status: He is alert.         Assessment/Plan   Diagnoses and all orders for this visit:  Tachycardia  -     Comprehensive Metabolic Panel; Future  -     CBC and Auto Differential; Future  -     TSH with reflex to Free T4 if abnormal; Future  -     ECG 12 Lead  -     Referral to Cardiology; Future  Screening for malignant neoplasm of prostate  -     Prostate Specific Antigen, Screen; Future

## 2024-06-20 PROBLEM — R03.0 ELEVATED BP WITHOUT DIAGNOSIS OF HYPERTENSION: Status: ACTIVE | Noted: 2024-06-20

## 2024-07-09 ENCOUNTER — APPOINTMENT (OUTPATIENT)
Dept: CARDIOLOGY | Facility: CLINIC | Age: 69
End: 2024-07-09
Payer: MEDICARE

## 2024-07-09 VITALS
HEART RATE: 70 BPM | WEIGHT: 188 LBS | BODY MASS INDEX: 26.32 KG/M2 | DIASTOLIC BLOOD PRESSURE: 90 MMHG | OXYGEN SATURATION: 97 % | SYSTOLIC BLOOD PRESSURE: 138 MMHG | HEIGHT: 71 IN

## 2024-07-09 DIAGNOSIS — R00.0 TACHYCARDIA: ICD-10-CM

## 2024-07-09 DIAGNOSIS — I49.3 PVC (PREMATURE VENTRICULAR CONTRACTION): ICD-10-CM

## 2024-07-09 DIAGNOSIS — E78.2 MIXED HYPERLIPIDEMIA: Primary | ICD-10-CM

## 2024-07-09 DIAGNOSIS — I48.92 PAROXYSMAL ATRIAL FLUTTER (MULTI): ICD-10-CM

## 2024-07-09 DIAGNOSIS — R03.0 ELEVATED BP WITHOUT DIAGNOSIS OF HYPERTENSION: ICD-10-CM

## 2024-07-09 DIAGNOSIS — E11.9 CONTROLLED TYPE 2 DIABETES MELLITUS WITHOUT COMPLICATION, WITHOUT LONG-TERM CURRENT USE OF INSULIN (MULTI): ICD-10-CM

## 2024-07-09 PROCEDURE — 99204 OFFICE O/P NEW MOD 45 MIN: CPT | Performed by: INTERNAL MEDICINE

## 2024-07-09 PROCEDURE — 1036F TOBACCO NON-USER: CPT | Performed by: INTERNAL MEDICINE

## 2024-07-09 PROCEDURE — 3075F SYST BP GE 130 - 139MM HG: CPT | Performed by: INTERNAL MEDICINE

## 2024-07-09 PROCEDURE — 3080F DIAST BP >= 90 MM HG: CPT | Performed by: INTERNAL MEDICINE

## 2024-07-09 PROCEDURE — 1159F MED LIST DOCD IN RCRD: CPT | Performed by: INTERNAL MEDICINE

## 2024-07-09 RX ORDER — METOPROLOL SUCCINATE 25 MG/1
25 TABLET, EXTENDED RELEASE ORAL DAILY
Qty: 90 TABLET | Refills: 3 | Status: SHIPPED | OUTPATIENT
Start: 2024-07-09 | End: 2025-07-09

## 2024-07-09 NOTE — PROGRESS NOTES
Subjective   Reji Ingram is a 68 y.o. male.    Chief Complaint:  Rapid heart rate.    HPI    He is here for evaluation of a rapid heart rate.  He has a smart watch and it has been notifying him that his heart rates are elevated.  He noticed heart rates at times until the 120 beats per minute to 130 bpm.  He had an event while he was visiting his primary care physician.  He had an EKG which was significantly abnormal showing a heart rate of 121.  We are asked to see and evaluate.  He is asymptomatic with respect to his arrhythmia.  Denies palpitations or tachycardia.  He does exercise regularly and runs about 20 to 30 miles per week.    His past cardiac history is unremarkable.  No history of hypertension.  No history of any past cardiac events.    He had a stress echo study in  which was normal.    Past medical history: Significant for diabetes and hyperlipidemia.    Past surgical history: Cholecystectomy knee surgery and appendectomy.    Social history: He is a non-smoker and has never smoked.  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    Family history: Father  young of accidental trauma.  Mother had multiple problems including severe rheumatoid arthritis.    Review of Systems   All other systems reviewed and are negative.      Current Outpatient Medications   Medication Sig Dispense Refill    aspirin 81 mg EC tablet Take 1 tablet (81 mg) by mouth once daily.      B complex-vitamin C-folic acid (Nephro-Javed) 0.8 mg tablet Take 1 tablet by mouth once daily.      cetirizine (ZyrTEC) 10 mg capsule Take by mouth early in the morning..      Farxiga 10 mg TAKE ONE TABLET BY MOUTH EVERY MORNING with or without food 90 tablet 2    magnesium glycinate (Mag Glycinate) 100 mg tablet Take 500 mg by mouth.      metFORMIN (Glucophage) 500 mg tablet Take 1 tablet (500 mg) by mouth 2 times a day. 180 tablet 2    rosuvastatin (Crestor) 5 mg tablet Take 1 tablet (5 mg) by mouth once daily at bedtime. 90 tablet 2     "apixaban (Eliquis) 5 mg tablet Take 1 tablet (5 mg) by mouth 2 times a day. 60 tablet 11    metoprolol succinate XL (Toprol-XL) 25 mg 24 hr tablet Take 1 tablet (25 mg) by mouth once daily. Do not crush or chew. 90 tablet 3    omega 3-dha-epa-fish oil (Fish OiL) 1,000 mg (120 mg-180 mg) capsule Take 1,400 mg by mouth.       No current facility-administered medications for this visit.        Visit Vitals  /90 (BP Location: Left arm)   Pulse 70   Ht 1.803 m (5' 11\")   Wt 85.3 kg (188 lb)   SpO2 97%   BMI 26.22 kg/m²   Smoking Status Never   BSA 2.07 m²        Objective     Constitutional:       Appearance: Not in distress.   Neck:      Vascular: JVD normal.   Pulmonary:      Breath sounds: Normal breath sounds.   Cardiovascular:      Normal rate. Regular rhythm. S1 with normal intensity. S2 with normal intensity.       Murmurs: There is no murmur.      No gallop.    Pulses:     Intact distal pulses.   Edema:     Peripheral edema absent.   Abdominal:      General: Bowel sounds are normal.   Neurological:      Mental Status: Alert and oriented to person, place and time.         Lab Review:   Lab Results   Component Value Date     07/15/2022    K 4.1 07/15/2022     07/15/2022    CO2 29 07/15/2022    BUN 22 07/15/2022    CREATININE 0.84 07/15/2022    GLUCOSE 223 (H) 07/15/2022    CALCIUM 9.5 07/15/2022     Lab Results   Component Value Date    CHOL 220 (H) 07/15/2022    TRIG 146 07/15/2022    HDL 45.6 07/15/2022       Assessment:    1.  Atrial flutter/fibrillation.  There is suggestion of flutter waves in lead V1 and V2.  However if this is not totally clear and this may be atrial fibrillation.  Will start low-dose beta-blocker since his baseline heart rate is about 60.  Will also start anticoagulation in the form of Eliquis 5 mg twice daily.  After 1 week on the beta-blocker we will get a Holter monitor to determine his arrhythmia burden.  A high probability option for the future is to do a pulmonary " vein isolation procedure.  I believe he is a good candidate for this procedure.    2.  Hyperlipidemia.  Will arrange for him to have a CT calcium score.    3.  Elevated blood pressure.  Blood pressures are somewhat elevated but this is our first visit.

## 2024-07-09 NOTE — PATIENT INSTRUCTIONS
Start Eliquis 5 mg twice daily    Start metoprolol succinate 25 mg one daily    Come back in one week for a Holter monitor.    We will arrange for you to have a CT calcium score.    We will see you back in one month.

## 2024-07-16 ENCOUNTER — APPOINTMENT (OUTPATIENT)
Dept: CARDIOLOGY | Facility: CLINIC | Age: 69
End: 2024-07-16
Payer: MEDICARE

## 2024-07-16 DIAGNOSIS — I48.92 PAROXYSMAL ATRIAL FLUTTER (MULTI): ICD-10-CM

## 2024-07-16 PROCEDURE — 93225 XTRNL ECG REC<48 HRS REC: CPT | Performed by: INTERNAL MEDICINE

## 2024-07-16 PROCEDURE — 93227 XTRNL ECG REC<48 HR R&I: CPT | Performed by: INTERNAL MEDICINE

## 2024-08-08 NOTE — PROGRESS NOTES
"Maine Ingram is a 69 y.o. male.    Chief Complaint:  Palpitations and tachycardia    HPI    He is here for follow-up of his palpitations and tachycardia.  We performed a Holter monitor.  He is here for follow-up of the results.  Continues to exercise on a regular basis    His past cardiac history is unremarkable.  No history of hypertension.  No history of any past cardiac events.     He had a stress echo study in  which was normal.     Past medical history: Significant for diabetes and hyperlipidemia.     Past surgical history: Cholecystectomy knee surgery and appendectomy.     Social history: He is a non-smoker and has never smoked.  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     Family history: Father  young of accidental trauma.  Mother had multiple problems including severe rheumatoid arthritis.    Review of Systems   Cardiovascular:  Positive for palpitations.       Current Outpatient Medications   Medication Sig Dispense Refill    apixaban (Eliquis) 5 mg tablet Take 1 tablet (5 mg) by mouth 2 times a day. 60 tablet 11    B complex-vitamin C-folic acid (Nephro-Javed) 0.8 mg tablet Take 1 tablet by mouth once daily.      Farxiga 10 mg TAKE ONE TABLET BY MOUTH EVERY MORNING with or without food 90 tablet 2    magnesium glycinate (Mag Glycinate) 100 mg tablet Take 500 mg by mouth.      metFORMIN (Glucophage) 500 mg tablet Take 1 tablet (500 mg) by mouth 2 times a day. 180 tablet 2    metoprolol succinate XL (Toprol-XL) 25 mg 24 hr tablet Take 1 tablet (25 mg) by mouth once daily. Do not crush or chew. 90 tablet 3    rosuvastatin (Crestor) 5 mg tablet Take 1 tablet (5 mg) by mouth once daily at bedtime. 90 tablet 2    flecainide (Tambocor) 50 mg tablet Take 1 tablet (50 mg) by mouth 2 times a day. 180 tablet 3     No current facility-administered medications for this visit.        Visit Vitals  /80 (BP Location: Right arm)   Pulse 60   Ht 1.803 m (5' 11\")   Wt 84.4 kg (186 lb)   SpO2 " 97%   BMI 25.94 kg/m²   Smoking Status Never   BSA 2.06 m²        Objective     Constitutional:       Appearance: Not in distress.   Neck:      Vascular: JVD normal.   Pulmonary:      Breath sounds: Normal breath sounds.   Cardiovascular:      Normal rate. Regular rhythm. S1 with normal intensity. S2 with normal intensity.       Murmurs: There is no murmur.      No gallop.    Pulses:     Intact distal pulses.   Edema:     Peripheral edema absent.   Abdominal:      General: Bowel sounds are normal.   Neurological:      Mental Status: Alert and oriented to person, place and time.         Lab Review:   Lab Results   Component Value Date     07/15/2022    K 4.1 07/15/2022     07/15/2022    CO2 29 07/15/2022    BUN 22 07/15/2022    CREATININE 0.84 07/15/2022    GLUCOSE 223 (H) 07/15/2022    CALCIUM 9.5 07/15/2022       Assessment:    1.  Paroxysmal atrial fibrillation.  His monitor strips show episodes of paroxysmal atrial fibrillation.  He was in atrial fibrillation about 10% during the time.  Rhythm strips labeled V. tach is actually SVT CHELY with a Maloney C.  Has a large number of PACs.  We will add flecainide 50 mg twice daily.  Will refer to electrophysiology service for evaluation.  I believe he is a good candidate for a pulmonary vein isolation procedure.  This was discussed with the patient.    2.  Coronary disease risk factors.  We are going to get a coronary CT calcium score.    3.  Hypertension.  On today's visit blood pressures are normal.

## 2024-08-09 ENCOUNTER — APPOINTMENT (OUTPATIENT)
Dept: CARDIOLOGY | Facility: CLINIC | Age: 69
End: 2024-08-09
Payer: MEDICARE

## 2024-08-09 VITALS
HEART RATE: 60 BPM | BODY MASS INDEX: 26.04 KG/M2 | WEIGHT: 186 LBS | SYSTOLIC BLOOD PRESSURE: 120 MMHG | OXYGEN SATURATION: 97 % | HEIGHT: 71 IN | DIASTOLIC BLOOD PRESSURE: 80 MMHG

## 2024-08-09 DIAGNOSIS — E78.2 MIXED HYPERLIPIDEMIA: ICD-10-CM

## 2024-08-09 DIAGNOSIS — R03.0 ELEVATED BP WITHOUT DIAGNOSIS OF HYPERTENSION: ICD-10-CM

## 2024-08-09 DIAGNOSIS — I49.3 PVC (PREMATURE VENTRICULAR CONTRACTION): ICD-10-CM

## 2024-08-09 DIAGNOSIS — I48.0 PAROXYSMAL ATRIAL FIBRILLATION (MULTI): Primary | ICD-10-CM

## 2024-08-09 PROBLEM — I48.92 PAROXYSMAL ATRIAL FLUTTER (MULTI): Status: RESOLVED | Noted: 2024-07-09 | Resolved: 2024-08-09

## 2024-08-09 PROCEDURE — 99214 OFFICE O/P EST MOD 30 MIN: CPT | Performed by: INTERNAL MEDICINE

## 2024-08-09 PROCEDURE — 3074F SYST BP LT 130 MM HG: CPT | Performed by: INTERNAL MEDICINE

## 2024-08-09 PROCEDURE — 3079F DIAST BP 80-89 MM HG: CPT | Performed by: INTERNAL MEDICINE

## 2024-08-09 PROCEDURE — 1159F MED LIST DOCD IN RCRD: CPT | Performed by: INTERNAL MEDICINE

## 2024-08-09 PROCEDURE — 1036F TOBACCO NON-USER: CPT | Performed by: INTERNAL MEDICINE

## 2024-08-09 PROCEDURE — 3008F BODY MASS INDEX DOCD: CPT | Performed by: INTERNAL MEDICINE

## 2024-08-09 RX ORDER — FLECAINIDE ACETATE 50 MG/1
50 TABLET ORAL 2 TIMES DAILY
Qty: 180 TABLET | Refills: 3 | Status: SHIPPED | OUTPATIENT
Start: 2024-08-09 | End: 2025-08-09

## 2024-08-09 ASSESSMENT — ENCOUNTER SYMPTOMS: PALPITATIONS: 1

## 2024-08-09 NOTE — PATIENT INSTRUCTIONS
Start flecainide 50 mg twice daily.  This is a medication to control your heart rhythm.    We will send you to  one of our electophysiologists (Dr. Fonseca or Dr. Ramicone) for a possible ablation procedure.

## 2024-08-22 PROBLEM — I48.0 PAROXYSMAL ATRIAL FIBRILLATION (MULTI): Chronic | Status: ACTIVE | Noted: 2024-08-09

## 2024-08-22 NOTE — PROGRESS NOTES
Reason For Consult    Atrial Fibrillation    History Of Present Illness    This is a 69-year-old male with atrial fibrillation.  He is being seen today in consultation at the request of Dr. Zambrano.  The patient first identified the atrial fibrillation in May 2024.  The atrial fibrillation was detected by his [employer] watch.  He was noticing that he was tachycardic and his heart rate would stay elevated after he would exercise.  After he was identified to have atrial fibrillation he was placed on anticoagulation and is now on flecainide 50 mg twice daily.  He states that since taking the flecainide he has been maintaining sinus rhythm.  No side effects related to the use of flecainide and his heart rate has been stable based on his watch recording this.    Past medical history:    Paroxysmal atrial fibrillation  Diabetes mellitus  Hyperlipidemia    Past surgical history:    Cholecystectomy  Knee surgery (ACL reconstruction)  Appendectomy    Social history: Non-smoker    Family history: Negative for premature CAD     Review of systems  Other review of systems negative other than as outlined in HPI     Social History  He reports that he has never smoked. He has never used smokeless tobacco. He reports that he does not currently use alcohol. He reports that he does not use drugs.    Family History  Family History   Problem Relation Name Age of Onset    Arthritis Mother Eloina Lee     Hypertension Sister Zuri Salvador     Arthritis Sister Zuri Salvador     Accidental death Father Juan Abdallalatrice      Allergies  Adhesive tape-silicones, Atorvastatin, and Penicillins    Medications  Current Outpatient Medications   Medication Instructions    apixaban (ELIQUIS) 5 mg, oral, 2 times daily    B complex-vitamin C-folic acid (Nephro-Javed) 0.8 mg tablet 0.8 mg, oral, Daily    Farxiga 10 mg TAKE ONE TABLET BY MOUTH EVERY MORNING with or without food    flecainide (TAMBOCOR) 50 mg, oral, 2 times daily    Mag Glycinate 500  "mg, oral    metFORMIN (GLUCOPHAGE) 500 mg, oral, 2 times daily    metoprolol succinate XL (TOPROL-XL) 25 mg, oral, Daily, Do not crush or chew.    rosuvastatin (CRESTOR) 5 mg, oral, Nightly     Vitals      6/1/2023     8:29 AM 6/1/2023     9:06 AM 3/12/2024     8:31 AM 3/12/2024     8:48 AM 6/19/2024    12:05 PM 7/9/2024     8:12 AM 8/9/2024     9:42 AM   Vitals   Systolic 140 120 149 125 135 138 120   Diastolic 78 78 85 79 76 90 80   Heart Rate 47  64  62 70 60   Temp 36.3 °C (97.4 °F)  36.1 °C (96.9 °F)  36.2 °C (97.1 °F)     Height (in)   1.803 m (5' 11\")  1.803 m (5' 11\") 1.803 m (5' 11\") 1.803 m (5' 11\")   Weight (lb) 182  183.6  186.6 188 186   BMI 25.38 kg/m2  25.61 kg/m2  26.03 kg/m2 26.22 kg/m2 25.94 kg/m2   BSA (m2) 2.03 m2  2.04 m2  2.06 m2 2.07 m2 2.06 m2   Visit Report Report Report Report Report Report Report Report     Physical Exam    General Appearance:  Alert, oriented, no distress  Skin:  Warm and dry  Head and Neck:  No elevation of JVP, no carotid bruits  Cardiac Exam:  Rhythm is regular, S1 and S2 are normal, no murmur S3 or S4  Lungs:  Clear to auscultation  Extremities:  no edema  Neurologic:  No focal deficits  Psychiatric:  Appropriate mood and behavior    Test Results    Stress test Sept 2021:  No ischemia  Holter July 2024:  NSR with PAF/RVR, 10% AF burden  Assessment/Plan  Problem List Items Addressed This Visit             ICD-10-CM    Paroxysmal atrial fibrillation (Multi) - Primary (Chronic) I48.0     1.  Paroxysmal atrial fibrillation: Reji is in sinus rhythm today and is doing well on flecainide.  We will continue with flecainide 50 mg twice daily.  I discussed the option of pulmonary vein isolation which can be considered at any time if the arrhythmia becomes more problematic on antiarrhythmic drug therapy.  He states that since he started the flecainide he has been maintaining sinus rhythm and is monitoring his rhythm regularly with his [employer] watch.  For now we will continue " with antiarrhythmic drug therapy.  I have asked him to keep a log of the AF episodes for me to review at the time of the next office visit in 6 to 8 months.    The PVI procedure and risks were discussed and patient literature was provided.          James C Ramicone, DO

## 2024-08-23 ENCOUNTER — OFFICE VISIT (OUTPATIENT)
Dept: CARDIOLOGY | Facility: CLINIC | Age: 69
End: 2024-08-23
Payer: MEDICARE

## 2024-08-23 VITALS
BODY MASS INDEX: 26.04 KG/M2 | DIASTOLIC BLOOD PRESSURE: 80 MMHG | OXYGEN SATURATION: 98 % | WEIGHT: 186 LBS | HEIGHT: 71 IN | HEART RATE: 57 BPM | SYSTOLIC BLOOD PRESSURE: 120 MMHG

## 2024-08-23 DIAGNOSIS — I48.0 PAROXYSMAL ATRIAL FIBRILLATION (MULTI): Primary | ICD-10-CM

## 2024-08-23 PROCEDURE — 99214 OFFICE O/P EST MOD 30 MIN: CPT | Performed by: INTERNAL MEDICINE

## 2024-08-23 NOTE — ASSESSMENT & PLAN NOTE
1.  Paroxysmal atrial fibrillation: Reji is in sinus rhythm today and is doing well on flecainide.  We will continue with flecainide 50 mg twice daily.  I discussed the option of pulmonary vein isolation which can be considered at any time if the arrhythmia becomes more problematic on antiarrhythmic drug therapy.  He states that since he started the flecainide he has been maintaining sinus rhythm and is monitoring his rhythm regularly with his [employer] watch.  For now we will continue with antiarrhythmic drug therapy.  I have asked him to keep a log of the AF episodes for me to review at the time of the next office visit in 6 to 8 months.    The PVI procedure and risks were discussed and patient literature was provided.

## 2024-09-09 ASSESSMENT — ENCOUNTER SYMPTOMS
POLYPHAGIA: 0
HEADACHES: 0
ADENOPATHY: 0
VOMITING: 0
ARTHRALGIAS: 0
DYSURIA: 0
EYE REDNESS: 0
NAUSEA: 0
BLOOD IN STOOL: 0
RHINORRHEA: 0
EYE DISCHARGE: 0
SORE THROAT: 0
WHEEZING: 0
POLYDIPSIA: 0
DIARRHEA: 0
DIZZINESS: 0
CHILLS: 0
COUGH: 0
BRUISES/BLEEDS EASILY: 0
SHORTNESS OF BREATH: 0
HEMATURIA: 0
NERVOUS/ANXIOUS: 0
CONSTIPATION: 0
PALPITATIONS: 0
FREQUENCY: 0
ABDOMINAL PAIN: 0
MYALGIAS: 0
FATIGUE: 0
DYSPHORIC MOOD: 0
FEVER: 0

## 2024-09-09 NOTE — PROGRESS NOTES
Subjective   Patient ID: Reji Ingram is a 69 y.o. male who presents for Medicare Annual Wellness Visit Subsequent.    This is a medicare wellness appt; please ask all medicare questions  Is pt fasting?  Yes   Does pt see any providers other than care team below: no  Van warren, buma, ramicone, erasto    Bps at home- 120/76  Sugars am-not checking  Sugars 2hrs pp-not checking  Last eye dr - scheduled, but was around this time last year.   If in the last 12mon narendra  Last dentist - had to cancel last appt, so he's overdue   Last foot check 3/2024  B12-3/2024  On statin  Does pt want pneumonia shot?  No   Does pt want a flu shot? No   Any questions or concerns today?  Left hand thumb pain- he thinks its possibly its arthritis from baseball and noticed pain in a tendon in left hand.  Left knee pain       Poc A1c=6.9  Poc microalb=nl    No care team member to display    HPI  Last labs-6/2024 Sugar high at 179. Goal <100. Decr carbs and sugars. You have known diabetes.  kidney function, liver function and electrolytes in the CMP (comprehensive metabolic panel) were normal.  White blood cells slightly low. Recheck in 2wks.  TSH (thyroid test) was normal.  PSA (prostate blood test) is normal.  3/2024 kidney function, liver function and electrolytes in the CMP (comprehensive metabolic panel) were normal.  Sugar high at 148. Goal <100. Decr carbs and sugars. Pt has diabetes  Hdl and trigs normal.  Ldl high at 116. Goal <100. Decr fats and incr fiber  B12 normal  A1C 6.3  This is the 3 month average of your sugars.  Goal <7.0  2/2023 a1c 7.2, microalb nl, liver neg; 10/2022 sugar 214, lipid nl; 8/2021 psa nl  Due for labs- cbc-print; lipid    Cholesterol   Date Value Ref Range Status   07/15/2022 220 (H) 0 - 199 mg/dL Final     Comment:     .      AGE      DESIRABLE   BORDERLINE HIGH   HIGH     0-19 Y     0 - 169       170 - 199     >/= 200    20-24 Y     0 - 189       190 - 224     >/= 225         >24 Y     0 - 199       200  - 239     >/= 240   **All ranges are based on fasting samples. Specific   therapeutic targets will vary based on patient-specific   cardiac risk.  .   Pediatric guidelines reference:Pediatrics 2011, 128(S5).   Adult guidelines reference: NCEP ATPIII Guidelines,     DANUTA 2001, 258:2486-97  .   Venipuncture immediately after or during the    administration of Metamizole may lead to falsely   low results. Testing should be performed immediately   prior to Metamizole dosing.     08/09/2021 204 (H) 0 - 199 mg/dL Final     Comment:     .      AGE      DESIRABLE   BORDERLINE HIGH   HIGH     0-19 Y     0 - 169       170 - 199     >/= 200    20-24 Y     0 - 189       190 - 224     >/= 225         >24 Y     0 - 199       200 - 239     >/= 240   **All ranges are based on fasting samples. Specific   therapeutic targets will vary based on patient-specific   cardiac risk.  .   Pediatric guidelines reference:Pediatrics 2011, 128(S5).   Adult guidelines reference: NCEP ATPIII Guidelines,     DANUTA 2001, 258:2486-97  .   Venipuncture immediately after or during the    administration of Metamizole may lead to falsely   low results. Testing should be performed immediately   prior to Metamizole dosing.       Triglycerides   Date Value Ref Range Status   07/15/2022 146 0 - 149 mg/dL Final     Comment:     .      AGE      DESIRABLE   BORDERLINE HIGH   HIGH     VERY HIGH   0 D-90 D    19 - 174         ----         ----        ----  91 D- 9 Y     0 -  74        75 -  99     >/= 100      ----    10-19 Y     0 -  89        90 - 129     >/= 130      ----    20-24 Y     0 - 114       115 - 149     >/= 150      ----         >24 Y     0 - 149       150 - 199    200- 499    >/= 500  .   Venipuncture immediately after or during the    administration of Metamizole may lead to falsely   low results. Testing should be performed immediately   prior to Metamizole dosing.     08/09/2021 85 0 - 149 mg/dL Final     Comment:     .      AGE      DESIRABLE    "BORDERLINE HIGH   HIGH     VERY HIGH   0 D-90 D    19 - 174         ----         ----        ----  91 D- 9 Y     0 -  74        75 -  99     >/= 100      ----    10-19 Y     0 -  89        90 - 129     >/= 130      ----    20-24 Y     0 - 114       115 - 149     >/= 150      ----         >24 Y     0 - 149       150 - 199    200- 499    >/= 500  .   Venipuncture immediately after or during the    administration of Metamizole may lead to falsely   low results. Testing should be performed immediately   prior to Metamizole dosing.       HDL   Date Value Ref Range Status   07/15/2022 45.6 mg/dL Final     Comment:     .      AGE      VERY LOW   LOW     NORMAL    HIGH       0-19 Y       < 35   < 40     40-45     ----    20-24 Y       ----   < 40       >45     ----      >24 Y       ----   < 40     40-60      >60  .     08/09/2021 42.5 mg/dL Final     Comment:     .      AGE      VERY LOW   LOW     NORMAL    HIGH       0-19 Y       < 35   < 40     40-45     ----    20-24 Y       ----   < 40       >45     ----      >24 Y       ----   < 40     40-60      >60  .       No results found for: \"LDL\"  No results found for: \"TSH\"  No results found for: \"A1C\"  No components found for: \"POCA1C\"  No results found for: \"ALBUR\"  No components found for: \"POCALBUR\"      Other concerns: Left thumb pain (base)- he thinks its possibly its arthritis from baseball-catcher  noticed pain in a tendon in left palm; no trigger finger  Declines xray, voltaren gel, hand ortho    Left knee pain x 40yrs, been 15ys since saw knee ortho  Knee has shifted inward  When run jamming toes  Walks normally       ER/urgicare visits in the last year- none  Hospitalizations in the last year- none      last colonoscopy/cologuard/FIT (45-75) 3/17/22- due 3/2032  FH colon ca-none  FH prostate ca-none      Exercise- running x4mon solid-35-40 miles a month  Diet-3 meals but busy with 2 jobs  Has [employer]   Body mass index is 25.75 kg/m².    last eye dr amaya- " glasses; scheduled  No vision issues    last dental appt- overdue    BMs-regular  Sleep-able to fall asleep and stay asleep; no snoring or apnea  no cp, swelling, sob, abd pain, n/v/d/c, blood in stool or black stools  STI testing including hiv (age 15-65) and hep c screening (18-79)-declines  PSA 50-85 6/2024      Immunization History   Administered Date(s) Administered    Moderna COVID-19 vaccine, Fall 2023, 12 yeasrs and older (50mcg/0.5mL) 10/09/2023    Moderna SARS-CoV-2 Vaccination 02/16/2021, 03/15/2021, 10/25/2021, 04/08/2022    Tdap vaccine, age 7 year and older (BOOSTRIX, ADACEL) 08/09/2021    Zoster vaccine, recombinant, adult (SHINGRIX) 08/09/2021, 11/20/2021     Declines prevnar and flu shots    fractures in lifetime-rt hand, rt middle finger aug 2022, lf thumb, lf knee 1984  Pt is a catcher  FH osteoporosis-    FH heart attack, heart surgery-none  FH stroke-none    Stress echo test nl 9/2021     The 10-year ASCVD risk score (Roxanna DK, et al., 2019) is: 28.8%    Values used to calculate the score:      Age: 69 years      Sex: Male      Is Non- : No      Diabetic: Yes      Tobacco smoker: No      Systolic Blood Pressure: 122 mmHg      Is BP treated: No      HDL Cholesterol: 45.6 mg/dL      Total Cholesterol: 220 mg/dL  Sees cardio dr      Review of Systems   Constitutional:  Negative for chills, fatigue and fever.   HENT:  Negative for congestion, ear pain, postnasal drip, rhinorrhea and sore throat.    Eyes:  Negative for discharge, redness and visual disturbance.   Respiratory:  Negative for cough, shortness of breath and wheezing.    Cardiovascular:  Negative for chest pain, palpitations and leg swelling.   Gastrointestinal:  Negative for abdominal pain, blood in stool, constipation, diarrhea, nausea and vomiting.   Endocrine: Negative for cold intolerance, polydipsia, polyphagia and polyuria.   Genitourinary:  Negative for dysuria, frequency, genital sores, hematuria, penile  pain, testicular pain and urgency.   Musculoskeletal:  Negative for arthralgias and myalgias.        Lf knee pain    Lf base of thumb pain    Lf palm pain   Skin:  Negative for rash.   Neurological:  Negative for dizziness, syncope and headaches.   Hematological:  Negative for adenopathy. Does not bruise/bleed easily.   Psychiatric/Behavioral:  Negative for dysphoric mood. The patient is not nervous/anxious.        Objective   Visit Vitals  /76   Pulse 52   Temp 35.9 °C (96.6 °F)        BP Readings from Last 3 Encounters:   09/11/24 122/76   08/23/24 120/80   08/09/24 120/80     Wt Readings from Last 3 Encounters:   09/11/24 83.7 kg (184 lb 9.6 oz)   08/23/24 84.4 kg (186 lb)   08/09/24 84.4 kg (186 lb)           Physical Exam  Vitals reviewed.   Constitutional:       General: He is not in acute distress.     Appearance: Normal appearance. He is not ill-appearing.   HENT:      Head: Normocephalic.      Right Ear: Tympanic membrane, ear canal and external ear normal.      Left Ear: Tympanic membrane, ear canal and external ear normal.      Nose: Nose normal.      Mouth/Throat:      Mouth: Mucous membranes are moist.      Pharynx: Oropharynx is clear. No oropharyngeal exudate or posterior oropharyngeal erythema.   Eyes:      Extraocular Movements: Extraocular movements intact.      Conjunctiva/sclera: Conjunctivae normal.      Pupils: Pupils are equal, round, and reactive to light.   Cardiovascular:      Rate and Rhythm: Normal rate and regular rhythm.      Heart sounds: Normal heart sounds. No murmur heard.  Pulmonary:      Effort: Pulmonary effort is normal. No respiratory distress.      Breath sounds: Normal breath sounds. No wheezing, rhonchi or rales.   Abdominal:      General: Bowel sounds are normal. There is no distension.      Palpations: Abdomen is soft. There is no mass.      Tenderness: There is no abdominal tenderness.   Musculoskeletal:         General: No swelling or tenderness. Normal range of  motion.      Cervical back: Normal range of motion and neck supple. No tenderness.      Right lower leg: No edema.      Left lower leg: No edema.      Comments: Lf knee-no redness rash or swelling   Lymphadenopathy:      Cervical: No cervical adenopathy.   Skin:     General: Skin is warm.      Findings: No rash.   Neurological:      General: No focal deficit present.      Mental Status: He is alert and oriented to person, place, and time.      Cranial Nerves: No cranial nerve deficit.   Psychiatric:         Mood and Affect: Mood normal.         Behavior: Behavior normal.         Assessment/Plan   Diagnoses and all orders for this visit:  Routine general medical examination at a health care facility  Chronic pain of left knee  -     Referral to Orthopaedic Surgery; Future  Mixed hyperlipidemia  -     Lipid Panel; Future  Controlled type 2 diabetes mellitus without complication, without long-term current use of insulin (Multi)  Other orders  -     Follow Up In Primary Care - Established; Future

## 2024-09-11 ENCOUNTER — APPOINTMENT (OUTPATIENT)
Dept: PRIMARY CARE | Facility: CLINIC | Age: 69
End: 2024-09-11
Payer: MEDICARE

## 2024-09-11 VITALS
DIASTOLIC BLOOD PRESSURE: 76 MMHG | HEART RATE: 52 BPM | HEIGHT: 71 IN | WEIGHT: 184.6 LBS | OXYGEN SATURATION: 96 % | TEMPERATURE: 96.6 F | SYSTOLIC BLOOD PRESSURE: 122 MMHG | BODY MASS INDEX: 25.84 KG/M2

## 2024-09-11 DIAGNOSIS — E11.9 CONTROLLED TYPE 2 DIABETES MELLITUS WITHOUT COMPLICATION, WITHOUT LONG-TERM CURRENT USE OF INSULIN (MULTI): ICD-10-CM

## 2024-09-11 DIAGNOSIS — Z00.00 ROUTINE GENERAL MEDICAL EXAMINATION AT A HEALTH CARE FACILITY: Primary | ICD-10-CM

## 2024-09-11 DIAGNOSIS — E78.2 MIXED HYPERLIPIDEMIA: ICD-10-CM

## 2024-09-11 DIAGNOSIS — G89.29 CHRONIC PAIN OF LEFT KNEE: ICD-10-CM

## 2024-09-11 DIAGNOSIS — M25.562 CHRONIC PAIN OF LEFT KNEE: ICD-10-CM

## 2024-09-11 LAB
NON-UH HIE BASO COUNT: 0.02 X1000 (ref 0–0.2)
NON-UH HIE BASOS %: 0.4 %
NON-UH HIE CALCULATED LDL CHOLESTEROL: 114 MG/DL (ref 60–130)
NON-UH HIE CHOLESTEROL: 182 MG/DL (ref 100–200)
NON-UH HIE DIFF?: NO
NON-UH HIE EOS COUNT: 0.13 X1000 (ref 0–0.5)
NON-UH HIE EOSIN %: 2.4 %
NON-UH HIE HCT: 50.3 % (ref 41–52)
NON-UH HIE HDL CHOLESTEROL: 50 MG/DL (ref 40–60)
NON-UH HIE HGB: 17.3 G/DL (ref 13.5–17.5)
NON-UH HIE INSTR WBC: 5.4
NON-UH HIE LYMPH %: 34.9 %
NON-UH HIE LYMPH COUNT: 1.88 X1000 (ref 1.2–4.8)
NON-UH HIE MCH: 31.5 PG (ref 27–34)
NON-UH HIE MCHC: 34.4 G/DL (ref 32–37)
NON-UH HIE MCV: 91.7 FL (ref 80–100)
NON-UH HIE MONO %: 8.5 %
NON-UH HIE MONO COUNT: 0.46 X1000 (ref 0.1–1)
NON-UH HIE MPV: 8.4 FL (ref 7.4–10.4)
NON-UH HIE NEUTROPHIL %: 53.8 %
NON-UH HIE NEUTROPHIL COUNT (ANC): 2.91 X1000 (ref 1.4–8.8)
NON-UH HIE NUCLEATED RBC: 0 /100WBC
NON-UH HIE PLATELET: 245 X10 (ref 150–450)
NON-UH HIE RBC: 5.49 X10 (ref 4.7–6.1)
NON-UH HIE RDW: 13 % (ref 11.5–14.5)
NON-UH HIE TOTAL CHOL/HDL CHOL RATIO: 3.6
NON-UH HIE TRIGLYCERIDES: 88 MG/DL (ref 30–150)
NON-UH HIE WBC: 5.4 X10 (ref 4.5–11)
POC ALBUMIN /CREATININE RATIO MANUALLY ENTERED: <30 UG/MG CREAT
POC HEMOGLOBIN A1C: 6.9 % (ref 4.2–6.5)
POC URINE ALBUMIN: 10 MG/L
POC URINE CREATININE: 200 MG/DL

## 2024-09-11 PROCEDURE — 3078F DIAST BP <80 MM HG: CPT | Performed by: NURSE PRACTITIONER

## 2024-09-11 PROCEDURE — 3074F SYST BP LT 130 MM HG: CPT | Performed by: NURSE PRACTITIONER

## 2024-09-11 PROCEDURE — 82044 UR ALBUMIN SEMIQUANTITATIVE: CPT | Performed by: NURSE PRACTITIONER

## 2024-09-11 PROCEDURE — 83036 HEMOGLOBIN GLYCOSYLATED A1C: CPT | Performed by: NURSE PRACTITIONER

## 2024-09-11 PROCEDURE — 3008F BODY MASS INDEX DOCD: CPT | Performed by: NURSE PRACTITIONER

## 2024-09-11 PROCEDURE — 1036F TOBACCO NON-USER: CPT | Performed by: NURSE PRACTITIONER

## 2024-09-11 PROCEDURE — 1170F FXNL STATUS ASSESSED: CPT | Performed by: NURSE PRACTITIONER

## 2024-09-11 PROCEDURE — 1159F MED LIST DOCD IN RCRD: CPT | Performed by: NURSE PRACTITIONER

## 2024-09-11 PROCEDURE — G0439 PPPS, SUBSEQ VISIT: HCPCS | Performed by: NURSE PRACTITIONER

## 2024-09-11 ASSESSMENT — ACTIVITIES OF DAILY LIVING (ADL)
DRESSING: INDEPENDENT
TAKING_MEDICATION: INDEPENDENT
GROCERY_SHOPPING: INDEPENDENT
MANAGING_FINANCES: INDEPENDENT
DOING_HOUSEWORK: INDEPENDENT
BATHING: INDEPENDENT

## 2024-09-11 NOTE — PATIENT INSTRUCTIONS
A1C today=6.9  This is the 3 month average of your sugars.  Goal <7.0      Handouts given to pt:  physical handout      Labs:    You can use the lab in our building when fasting. The hrs are: Mon-Fri 7a-330p.  No Saturday hrs. (No Saturday hrs at [facility] either)  No appt needed, BUT YOU DO NEED THE PAPER ORDER.  OR  [facility] Mon-FrI 7a-5p, Sat 8a-12p   [place] Mon-Fri 730a-4p, Sat  8a-12p  [facility] [address] Mon-Thurs 630a-6p , Fri 630a-4p, Sat 8a-12p  [facility] [address] Mon-Fri 7a-630p and Sat. 7a-3p    Fasting is no food, drink, gum or mints other than water for 12 hrs.   Results will be back in 2-3 business days for most labs. It is always recommended for any orders (labs, xrays, ultrasounds,MRI, ct scan, procedures etc) to check with your insurance provider for expected costs or expenses to you.         You will get your results via phone from my medical assistant if you do not have MyChart.  OR  You will get your results via [employer]    If a result is urgent, I will call to speak to you.    Vaccines:    Can get rsv vaccine at a pharmacy  ---- [id number] and flu shot-declines    General recommendations:  Exercise-cardio 4-5d/wk 30min each day  Diet-Breakfast-toast (my favorite Natalee Hernandez Delighful Multigrain or Leo's Killer Bread Good Seed thin-sliced)/bagel/English muffin-whole wheat flour as a 1st ingredient or cereal/oatmeal/granola bar-fiber 4g or more or protein like eggs or peanut butter; optional veggies  Lunch-protein, 1/2c carb or 2 slices bread, veg 1c  Dinner-protein, fist sized carb, veg 1c  Fruit 2 a day  Dairy 2 a day-milk, soy milk, almond milk, cheese, yogurt, cottage cheese  Snacks-Protein-hard boiled egg, nuts (walnuts/almonds/pecans/pistachios 1/4c), hummus, beef/deer jerky or meat sticks; vegetable, fruit, dairy-milk(1%, skim, almond, soy)/cheese (not a lot of cheddar)/yogurt (Greek is best-my favorite Dannon Fruit on the Bottom  Greek)/cottage cheese 2%; triscuits/ popcorn/wheat thins have a lot of fiber; follow serving size on bag/box/container  increase water  Limit alcohol to 1 drink per day for women and 2 drinks per day for men (1 drink=12oz beer or 5oz wine or 1 1/2oz liquor)  Calcium: 500mg 1 twice a day if age 50 and younger and 600mg 1 twice a day if over age 50 (calcium citrate can be taken without food)  Vitamin D: 800-5000 IU/day  Limit salt to <2300mg a day if age 50 and under and <1500mg a day if over age 50/have high bp or diabetes or kidney disease  Recommend folate for childbearing age women 0.4mg per day (can be found in a multivitamin)  Recommend 18mg/dL of iron a day if age 50 and under and 8mg/dL a day if over age 50; take on an empty stomach at bedtime  Use sunscreen   Wear seatbelt  Recommend safe sex practices: using condoms everytime you have sex, discuss with a new partner about their past partners/history of STDs/drug use, avoid drinking alcohol or using drugs as this increases the chance that you will participate in high-risk sex, for oral sex help protect your mouth by having your partner use a condom (male or female), women should not douche after sex, be aware of your partner's body and your body-look for signs of a sore, blister, rash, or discharge, and have regular exams and periodic tests for STDs.  No distracted driving  No driving when under influence of substances  Wear a seatbelt  Eye dr every 1-2yrs  Dentist every 6-12 mon  Tetanus shot every 10yrs  Recommend flu vaccine in the fall  Appt in 6mon for follow up on diabetes and 1 year for physical      I will communicate with you via [employer] regarding messages and results. If you need help with this, you can call the support line at [phone].    IT WAS A PLEASURE TO SEE YOU TODAY. THANK YOU FOR CHOOSING US FOR YOUR HEALTHCARE NEEDS.

## 2024-09-19 ENCOUNTER — HOSPITAL ENCOUNTER (OUTPATIENT)
Dept: RADIOLOGY | Facility: HOSPITAL | Age: 69
Discharge: HOME | End: 2024-09-19
Payer: MEDICARE

## 2024-09-19 DIAGNOSIS — E11.9 CONTROLLED TYPE 2 DIABETES MELLITUS WITHOUT COMPLICATION, WITHOUT LONG-TERM CURRENT USE OF INSULIN (MULTI): ICD-10-CM

## 2024-09-19 DIAGNOSIS — E78.2 MIXED HYPERLIPIDEMIA: ICD-10-CM

## 2024-09-19 PROCEDURE — 75571 CT HRT W/O DYE W/CA TEST: CPT

## 2024-10-30 PROBLEM — R73.03 PREDIABETES: Status: ACTIVE | Noted: 2024-10-30

## 2024-11-18 ENCOUNTER — OFFICE VISIT (OUTPATIENT)
Dept: ORTHOPEDIC SURGERY | Facility: CLINIC | Age: 69
End: 2024-11-18
Payer: MEDICARE

## 2024-11-18 DIAGNOSIS — M72.0 DUPUYTREN CONTRACTURE OF LEFT HAND: ICD-10-CM

## 2024-11-18 PROCEDURE — 1036F TOBACCO NON-USER: CPT | Performed by: ORTHOPAEDIC SURGERY

## 2024-11-18 PROCEDURE — 1159F MED LIST DOCD IN RCRD: CPT | Performed by: ORTHOPAEDIC SURGERY

## 2024-11-18 PROCEDURE — 99213 OFFICE O/P EST LOW 20 MIN: CPT | Performed by: ORTHOPAEDIC SURGERY

## 2024-11-18 PROCEDURE — 99214 OFFICE O/P EST MOD 30 MIN: CPT | Performed by: ORTHOPAEDIC SURGERY

## 2024-11-18 NOTE — PROGRESS NOTES
History present illness: Patient presents today for evaluation of his left upper extremity.  He describes symptoms compatible with early Dupuytren's.      Past medical history: The patient's past medical history, family history, social history, and review of systems were documented on the patient medical intake.  The updated data was reviewed in the electronic medical record.  History is negative except otherwise stated in history of present illness.        Physical examination:  General: Alert and oriented to person, place, and time.  No acute distress and breathing comfortably: Pleasant and cooperative with examination.  HEENT: Head is normocephalic and atraumatic.  Neck: Supple, no visible swelling.  Cardiovascular: No palpable tachycardia  Lungs: No audible wheezing or labored breathing  Abdomen: Nondistended.  Extremities: Evaluation of the left upper extremity finds the patient had palpable radial artery at the wrist with brisk capillary refill to all digits.  Patient has intact sensation to axillary radial median and ulnar nerves.  There are no open wounds.  There are no signs of infection.  There is no evidence of lymphedema or lymphatic streaking.  The patient has supple compartments to left arm forearm and hand.  Early Dupuytren's affecting the left small finger without postural change.      Radiology:      Assessment: Early Dupuytren's disease affecting left hand     Plan: Treatment options were discussed.  Recommendations were for simple observation at this time.llow-up with me in the office when he shows progression of deformity.        Procedure:

## 2024-11-20 ENCOUNTER — OFFICE VISIT (OUTPATIENT)
Dept: CARDIOLOGY | Facility: CLINIC | Age: 69
End: 2024-11-20
Payer: MEDICARE

## 2024-11-20 VITALS
SYSTOLIC BLOOD PRESSURE: 118 MMHG | HEART RATE: 55 BPM | WEIGHT: 186 LBS | BODY MASS INDEX: 25.94 KG/M2 | DIASTOLIC BLOOD PRESSURE: 78 MMHG | OXYGEN SATURATION: 99 %

## 2024-11-20 DIAGNOSIS — E78.2 MIXED HYPERLIPIDEMIA: ICD-10-CM

## 2024-11-20 DIAGNOSIS — I49.3 PVC (PREMATURE VENTRICULAR CONTRACTION): Primary | ICD-10-CM

## 2024-11-20 DIAGNOSIS — R03.0 ELEVATED BLOOD PRESSURE READING WITHOUT DIAGNOSIS OF HYPERTENSION: ICD-10-CM

## 2024-11-20 DIAGNOSIS — I25.10 CORONARY ARTERY DISEASE INVOLVING NATIVE CORONARY ARTERY OF NATIVE HEART WITHOUT ANGINA PECTORIS: ICD-10-CM

## 2024-11-20 PROCEDURE — 3074F SYST BP LT 130 MM HG: CPT | Performed by: INTERNAL MEDICINE

## 2024-11-20 PROCEDURE — 99214 OFFICE O/P EST MOD 30 MIN: CPT | Performed by: INTERNAL MEDICINE

## 2024-11-20 PROCEDURE — 1159F MED LIST DOCD IN RCRD: CPT | Performed by: INTERNAL MEDICINE

## 2024-11-20 PROCEDURE — 3078F DIAST BP <80 MM HG: CPT | Performed by: INTERNAL MEDICINE

## 2024-11-20 RX ORDER — ROSUVASTATIN CALCIUM 10 MG/1
10 TABLET, COATED ORAL NIGHTLY
Qty: 90 TABLET | Refills: 3 | Status: SHIPPED | OUTPATIENT
Start: 2024-11-20 | End: 2025-11-20

## 2024-11-20 NOTE — PATIENT INSTRUCTIONS
Increase the rosuvastatin to 10 mg daily    If you develop chest pressure or chest pains call us or go to the Emergency Room.

## 2024-11-20 NOTE — PROGRESS NOTES
Subjective   Reji Ingram is a 69 y.o. male.    Chief Complaint:  Follow-up coronary CT calcium scoring.  Follow-up paroxysmal atrial fibrillation.    HPI    He is here for follow-up of the results of his coronary CT calcium score.  He is also here for follow-up of paroxysmal atrial fibrillation.  Since his last visit and being on flecainide, he has not had any arrhythmias.  No episodes of tachycardia or irregular heartbeats.  Very rare episodes of palpitations.    He has diagnosis of coronary artery disease is based on a positive CT calcium score of 1534.  This study was done on 2024.      He had a stress echo study in  which was normal.     Past medical history: Significant for diabetes and hyperlipidemia.     Past surgical history: Cholecystectomy knee surgery and appendectomy.     Social history: He is a non-smoker and has never smoked.  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     Family history: Father  young of accidental trauma.  Mother had multiple problems including severe rheumatoid arthritis.     Review of Systems   Cardiovascular:  Positive for palpitations.     Current Outpatient Medications   Medication Sig Dispense Refill    apixaban (Eliquis) 5 mg tablet Take 1 tablet (5 mg) by mouth 2 times a day. 60 tablet 11    B complex-vitamin C-folic acid (Nephro-Javed) 0.8 mg tablet Take 1 tablet by mouth once daily.      Farxiga 10 mg TAKE ONE TABLET BY MOUTH EVERY MORNING with or without food 90 tablet 2    flecainide (Tambocor) 50 mg tablet Take 1 tablet (50 mg) by mouth 2 times a day. 180 tablet 3    magnesium glycinate (Mag Glycinate) 100 mg tablet Take 500 mg by mouth.      metFORMIN (Glucophage) 500 mg tablet Take 1 tablet (500 mg) by mouth 2 times a day. 180 tablet 2    metoprolol succinate XL (Toprol-XL) 25 mg 24 hr tablet Take 1 tablet (25 mg) by mouth once daily. Do not crush or chew. 90 tablet 3    rosuvastatin (Crestor) 10 mg tablet Take 1 tablet (10 mg) by mouth once  daily at bedtime. 90 tablet 3     No current facility-administered medications for this visit.        Visit Vitals  /78 (BP Location: Left arm, Patient Position: Sitting)   Pulse 55   Wt 84.4 kg (186 lb)   SpO2 99%   BMI 25.94 kg/m²   Smoking Status Never   BSA 2.06 m²        Objective     Constitutional:       Appearance: Not in distress.   Neck:      Vascular: JVD normal.   Pulmonary:      Breath sounds: Normal breath sounds.   Cardiovascular:      Normal rate. Regular rhythm. S1 with normal intensity. S2 with normal intensity.       Murmurs: There is no murmur.      No gallop.    Pulses:     Intact distal pulses.   Edema:     Peripheral edema absent.   Abdominal:      General: Bowel sounds are normal.   Neurological:      Mental Status: Alert and oriented to person, place and time.         Lab Review:   Lab Results   Component Value Date     07/15/2022    K 4.1 07/15/2022     07/15/2022    CO2 29 07/15/2022    BUN 22 07/15/2022    CREATININE 0.84 07/15/2022    GLUCOSE 223 (H) 07/15/2022    CALCIUM 9.5 07/15/2022     Lab Results   Component Value Date    CHOL 220 (H) 07/15/2022    TRIG 146 07/15/2022    HDL 45.6 07/15/2022       Assessment:    1.  Coronary artery disease.  Extensive coronary artery disease based on coronary CT calcium scoring.  He is asymptomatic.  Discussed with him at length that as long as he is asymptomatic, no further testing needed.  Should he develop any symptoms we would want to see him as soon as possible and set him up for imaging or he is to go to the emergency room.  He is on anticoagulation therapy with Eliquis.    2.  Paroxysmal atrial fibrillation.  Maintaining sinus rhythm on flecainide therapy.    3.  Hyperlipidemia.  Will increase rosuvastatin to 10 mg daily.  We would ultimately like to get him on 40 mg daily.

## 2024-12-02 ENCOUNTER — PATIENT MESSAGE (OUTPATIENT)
Dept: PRIMARY CARE | Facility: CLINIC | Age: 69
End: 2024-12-02
Payer: MEDICARE

## 2024-12-02 DIAGNOSIS — E11.65 UNCONTROLLED TYPE 2 DIABETES MELLITUS WITH HYPERGLYCEMIA: ICD-10-CM

## 2024-12-02 DIAGNOSIS — E11.9 TYPE 2 DIABETES MELLITUS WITHOUT COMPLICATION, WITHOUT LONG-TERM CURRENT USE OF INSULIN (MULTI): ICD-10-CM

## 2024-12-02 RX ORDER — DAPAGLIFLOZIN 10 MG/1
TABLET, FILM COATED ORAL
Qty: 30 TABLET | Refills: 0 | Status: SHIPPED | OUTPATIENT
Start: 2024-12-02

## 2024-12-02 RX ORDER — METFORMIN HYDROCHLORIDE 500 MG/1
500 TABLET ORAL 2 TIMES DAILY
Qty: 60 TABLET | Refills: 0 | Status: SHIPPED | OUTPATIENT
Start: 2024-12-02

## 2025-01-06 DIAGNOSIS — E11.65 UNCONTROLLED TYPE 2 DIABETES MELLITUS WITH HYPERGLYCEMIA: ICD-10-CM

## 2025-01-06 DIAGNOSIS — E11.9 TYPE 2 DIABETES MELLITUS WITHOUT COMPLICATION, WITHOUT LONG-TERM CURRENT USE OF INSULIN (MULTI): ICD-10-CM

## 2025-01-06 RX ORDER — DAPAGLIFLOZIN 10 MG/1
TABLET, FILM COATED ORAL
Qty: 90 TABLET | Refills: 2 | Status: SHIPPED | OUTPATIENT
Start: 2025-01-06

## 2025-01-06 RX ORDER — METFORMIN HYDROCHLORIDE 500 MG/1
500 TABLET ORAL 2 TIMES DAILY
Qty: 180 TABLET | Refills: 2 | Status: SHIPPED | OUTPATIENT
Start: 2025-01-06

## 2025-03-03 ENCOUNTER — PATIENT MESSAGE (OUTPATIENT)
Dept: PRIMARY CARE | Facility: CLINIC | Age: 70
End: 2025-03-03
Payer: MEDICARE

## 2025-03-03 DIAGNOSIS — E11.9 CONTROLLED TYPE 2 DIABETES MELLITUS WITHOUT COMPLICATION, WITHOUT LONG-TERM CURRENT USE OF INSULIN (MULTI): ICD-10-CM

## 2025-03-03 DIAGNOSIS — R73.03 PREDIABETES: Primary | ICD-10-CM

## 2025-03-03 DIAGNOSIS — E78.2 MIXED HYPERLIPIDEMIA: ICD-10-CM

## 2025-03-07 LAB
NON-UH HIE A/G RATIO: 1.2
NON-UH HIE ALB: 4 G/DL (ref 3.4–5)
NON-UH HIE ALK PHOS: 85 UNIT/L (ref 45–117)
NON-UH HIE BILIRUBIN, TOTAL: 0.9 MG/DL (ref 0.3–1.2)
NON-UH HIE BUN/CREAT RATIO: 21
NON-UH HIE BUN: 21 MG/DL (ref 9–23)
NON-UH HIE CALCIUM: 9.6 MG/DL (ref 8.7–10.4)
NON-UH HIE CALCULATED LDL CHOLESTEROL: 67 MG/DL (ref 60–130)
NON-UH HIE CALCULATED OSMOLALITY: 288 MOSM/KG (ref 275–295)
NON-UH HIE CHLORIDE: 104 MMOL/L (ref 98–107)
NON-UH HIE CHOLESTEROL: 130 MG/DL (ref 100–200)
NON-UH HIE CO2, VENOUS: 27 MMOL/L (ref 20–31)
NON-UH HIE CREATININE: 1 MG/DL (ref 0.6–1.1)
NON-UH HIE GFR AA: >60
NON-UH HIE GLOBULIN: 3.5 G/DL
NON-UH HIE GLOMERULAR FILTRATION RATE: >60 ML/MIN/1.73M?
NON-UH HIE GLUCOSE: 165 MG/DL (ref 74–106)
NON-UH HIE GOT: 23 UNIT/L (ref 15–37)
NON-UH HIE GPT: 28 UNIT/L (ref 10–49)
NON-UH HIE HDL CHOLESTEROL: 48 MG/DL (ref 40–60)
NON-UH HIE HGB A1C: 7.2 %
NON-UH HIE K: 4.5 MMOL/L (ref 3.5–5.1)
NON-UH HIE NA: 141 MMOL/L (ref 135–145)
NON-UH HIE TOTAL CHOL/HDL CHOL RATIO: 2.7
NON-UH HIE TOTAL PROTEIN: 7.5 G/DL (ref 5.7–8.2)
NON-UH HIE TRIGLYCERIDES: 77 MG/DL (ref 30–150)

## 2025-03-08 PROBLEM — R73.03 PREDIABETES: Status: RESOLVED | Noted: 2024-10-30 | Resolved: 2025-03-08

## 2025-03-10 ASSESSMENT — ENCOUNTER SYMPTOMS
CONSTITUTIONAL NEGATIVE: 1
SHORTNESS OF BREATH: 0
WHEEZING: 0

## 2025-03-10 NOTE — PROGRESS NOTES
Subjective   Patient ID: Reji Ingram is a 69 y.o. male who presents for Follow-up.  Last physical:9/11/24    ACP  Is pt fasting? No   Bps at home- does not do   Sugars am-not checking  Sugars 2hrs pp-not checking  Last eye dr - September -dr augustine  If in the last 12mon narendra  Last dentist -  was suppose to have one, but dentist quit   B12-3/2024  On statin  Does pt want pneumonia shot?  Yes   Any other questions or concerns that pt wants to discuss today? 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 Goal <100. Decr carbs and sugars.  You have known diabetes.  kidney function, liver function and electrolytes in the CMP (comprehensive metabolic panel) were normal.  A1C =7.2  This is the 3 month average of your sugars.  Goal <7.0  Last time it was 6.9  All cholesterol labs normal.  9/2024 A1c 6.9, microal nl,  Trigs and hdl normal.  Ldl high at 114. Goal <100. Decr fats and incr fiber.  White blood cells back to normal. No further testing needed.  Due for labs- none    Cholesterol   Date Value Ref Range Status   07/15/2022 220 (H) 0 - 199 mg/dL Final     Comment:     .      AGE      DESIRABLE   BORDERLINE HIGH   HIGH     0-19 Y     0 - 169       170 - 199     >/= 200    20-24 Y     0 - 189       190 - 224     >/= 225         >24 Y     0 - 199       200 - 239     >/= 240   **All ranges are based on fasting samples. Specific   therapeutic targets will vary based on patient-specific   cardiac risk.  .   Pediatric guidelines reference:Pediatrics 2011, 128(S5).   Adult guidelines reference: NCEP ATPIII Guidelines,     DANUTA 2001, 258:2486-97  .   Venipuncture immediately after or during the    administration of Metamizole may lead to falsely   low results. Testing should be performed immediately   prior to Metamizole dosing.     08/09/2021 204 (H) 0 - 199 mg/dL Final     Comment:     .      AGE      DESIRABLE   BORDERLINE  HIGH   HIGH     0-19 Y     0 - 169       170 - 199     >/= 200    20-24 Y     0 - 189       190 - 224     >/= 225         >24 Y     0 - 199       200 - 239     >/= 240   **All ranges are based on fasting samples. Specific   therapeutic targets will vary based on patient-specific   cardiac risk.  .   Pediatric guidelines reference:Pediatrics 2011, 128(S5).   Adult guidelines reference: NCEP ATPIII Guidelines,     DANUTA 2001, 258:2486-97  .   Venipuncture immediately after or during the    administration of Metamizole may lead to falsely   low results. Testing should be performed immediately   prior to Metamizole dosing.       Triglycerides   Date Value Ref Range Status   07/15/2022 146 0 - 149 mg/dL Final     Comment:     .      AGE      DESIRABLE   BORDERLINE HIGH   HIGH     VERY HIGH   0 D-90 D    19 - 174         ----         ----        ----  91 D- 9 Y     0 -  74        75 -  99     >/= 100      ----    10-19 Y     0 -  89        90 - 129     >/= 130      ----    20-24 Y     0 - 114       115 - 149     >/= 150      ----         >24 Y     0 - 149       150 - 199    200- 499    >/= 500  .   Venipuncture immediately after or during the    administration of Metamizole may lead to falsely   low results. Testing should be performed immediately   prior to Metamizole dosing.     08/09/2021 85 0 - 149 mg/dL Final     Comment:     .      AGE      DESIRABLE   BORDERLINE HIGH   HIGH     VERY HIGH   0 D-90 D    19 - 174         ----         ----        ----  91 D- 9 Y     0 -  74        75 -  99     >/= 100      ----    10-19 Y     0 -  89        90 - 129     >/= 130      ----    20-24 Y     0 - 114       115 - 149     >/= 150      ----         >24 Y     0 - 149       150 - 199    200- 499    >/= 500  .   Venipuncture immediately after or during the    administration of Metamizole may lead to falsely   low results. Testing should be performed immediately   prior to Metamizole dosing.       HDL   Date Value Ref Range Status  "  07/15/2022 45.6 mg/dL Final     Comment:     .      AGE      VERY LOW   LOW     NORMAL    HIGH       0-19 Y       < 35   < 40     40-45     ----    20-24 Y       ----   < 40       >45     ----      >24 Y       ----   < 40     40-60      >60  .     08/09/2021 42.5 mg/dL Final     Comment:     .      AGE      VERY LOW   LOW     NORMAL    HIGH       0-19 Y       < 35   < 40     40-45     ----    20-24 Y       ----   < 40       >45     ----      >24 Y       ----   < 40     40-60      >60  .       No results found for: \"LDL\"  No results found for: \"TSH\"  No results found for: \"A1C\"  No components found for: \"POCA1C\"  No results found for: \"ALBUR\"  No components found for: \"POCALBUR\"    Dupuytren contracture lf hand and saw hand dr    Still going to be doing baseball-will be 57th season  Has 600+ days in a row of running  Blister rt 2nd toe healing-scabbed. No open area; declines to stop running daily    Heart rate issue is helped by med  Dr lowery ordered ct cardiac score 1533; pt is going to ask him about this    Exercise-running daily  Diet-3 meals a day with more at 2nd job  Caffeine free diet pepsi, oj once a day, milk skim once a day, water with aaron drops    Immunization History   Administered Date(s) Administered    Moderna COVID-19 vaccine, 12 years and older (50mcg/0.5mL)(Spikevax) 10/09/2023, 10/15/2024    Moderna SARS-CoV-2 Vaccination 02/16/2021, 03/15/2021, 10/25/2021, 04/08/2022    Tdap vaccine, age 7 year and older (BOOSTRIX, ADACEL) 08/09/2021    Zoster vaccine, recombinant, adult (SHINGRIX) 08/09/2021, 11/20/2021        No care team member to display     Review of Systems   Constitutional: Negative.    Respiratory:  Negative for shortness of breath and wheezing.    Cardiovascular:  Negative for chest pain.       Objective   Visit Vitals  /76   Pulse 60   Temp 36.3 °C (97.3 °F)      BP Readings from Last 3 Encounters:   03/12/25 145/76   11/20/24 118/78   09/11/24 122/76     Wt Readings from Last 3 " Encounters:   03/12/25 87.1 kg (192 lb)   11/20/24 84.4 kg (186 lb)   09/11/24 83.7 kg (184 lb 9.6 oz)       The 10-year ASCVD risk score (Roxanna AN, et al., 2019) is: 36.9%    Values used to calculate the score:      Age: 69 years      Sex: Male      Is Non- : No      Diabetic: Yes      Tobacco smoker: No      Systolic Blood Pressure: 145 mmHg      Is BP treated: No      HDL Cholesterol: 45.6 mg/dL      Total Cholesterol: 220 mg/dL     Physical Exam  Constitutional:       General: He is not in acute distress.     Appearance: Normal appearance.   Feet:      Right foot:      Protective Sensation: 5 sites tested.  5 sites sensed.      Skin integrity: Skin integrity normal.      Left foot:      Protective Sensation: 5 sites tested.  5 sites sensed.      Skin integrity: Skin integrity normal.      Comments: Scabbed area inner 2nd toe. Not open. No discharge. No redness increasing around the area  Neurological:      Mental Status: He is alert.       Assessment/Plan   Diagnoses and all orders for this visit:  Controlled type 2 diabetes mellitus without complication, without long-term current use of insulin (Multi)  Mixed hyperlipidemia  Paroxysmal atrial fibrillation (Multi)  BMI 26.0-26.9,adult  Other orders  -     Follow Up In Primary Care - Established  -     Pneumococcal conjugate vaccine, 20-valent (PREVNAR 20)      See patient instructions for full plan

## 2025-03-12 ENCOUNTER — APPOINTMENT (OUTPATIENT)
Dept: PRIMARY CARE | Facility: CLINIC | Age: 70
End: 2025-03-12
Payer: MEDICARE

## 2025-03-12 VITALS
SYSTOLIC BLOOD PRESSURE: 136 MMHG | HEART RATE: 60 BPM | HEIGHT: 71 IN | WEIGHT: 192 LBS | TEMPERATURE: 97.3 F | BODY MASS INDEX: 26.88 KG/M2 | OXYGEN SATURATION: 96 % | DIASTOLIC BLOOD PRESSURE: 87 MMHG

## 2025-03-12 DIAGNOSIS — E78.2 MIXED HYPERLIPIDEMIA: ICD-10-CM

## 2025-03-12 DIAGNOSIS — I48.0 PAROXYSMAL ATRIAL FIBRILLATION (MULTI): ICD-10-CM

## 2025-03-12 DIAGNOSIS — E11.9 CONTROLLED TYPE 2 DIABETES MELLITUS WITHOUT COMPLICATION, WITHOUT LONG-TERM CURRENT USE OF INSULIN (MULTI): Primary | ICD-10-CM

## 2025-03-12 PROCEDURE — 3075F SYST BP GE 130 - 139MM HG: CPT | Performed by: NURSE PRACTITIONER

## 2025-03-12 PROCEDURE — 1124F ACP DISCUSS-NO DSCNMKR DOCD: CPT | Performed by: NURSE PRACTITIONER

## 2025-03-12 PROCEDURE — 1036F TOBACCO NON-USER: CPT | Performed by: NURSE PRACTITIONER

## 2025-03-12 PROCEDURE — 90677 PCV20 VACCINE IM: CPT | Performed by: NURSE PRACTITIONER

## 2025-03-12 PROCEDURE — 3008F BODY MASS INDEX DOCD: CPT | Performed by: NURSE PRACTITIONER

## 2025-03-12 PROCEDURE — 1159F MED LIST DOCD IN RCRD: CPT | Performed by: NURSE PRACTITIONER

## 2025-03-12 PROCEDURE — 99214 OFFICE O/P EST MOD 30 MIN: CPT | Performed by: NURSE PRACTITIONER

## 2025-03-12 PROCEDURE — 3078F DIAST BP <80 MM HG: CPT | Performed by: NURSE PRACTITIONER

## 2025-03-12 PROCEDURE — G0009 ADMIN PNEUMOCOCCAL VACCINE: HCPCS | Performed by: NURSE PRACTITIONER

## 2025-03-12 RX ORDER — NAPROXEN SODIUM 220 MG/1
TABLET ORAL
COMMUNITY
Start: 2024-10-01

## 2025-03-12 ASSESSMENT — PATIENT HEALTH QUESTIONNAIRE - PHQ9
SUM OF ALL RESPONSES TO PHQ9 QUESTIONS 1 AND 2: 0
2. FEELING DOWN, DEPRESSED OR HOPELESS: NOT AT ALL
1. LITTLE INTEREST OR PLEASURE IN DOING THINGS: NOT AT ALL

## 2025-03-12 NOTE — PATIENT INSTRUCTIONS
See dentist    [id number] vaccine today    Discuss ct cardiac score with dr lowery    Let me know if toe blister opens up again and I will get you to wound care/podiatry    Continue running  Decr carbs and sugars  Increase water      I will communicate with you via [employer] regarding messages and results. If you need help with this, you can call the support line at [phone].    IT WAS A PLEASURE TO SEE YOU TODAY. THANK YOU FOR CHOOSING US FOR YOUR HEALTHCARE NEEDS.

## 2025-04-09 PROBLEM — E11.9 DIABETES MELLITUS, TYPE 2 (MULTI): Status: ACTIVE | Noted: 2025-04-09

## 2025-04-09 PROBLEM — M17.12 UNILATERAL PRIMARY OSTEOARTHRITIS, LEFT KNEE: Status: ACTIVE | Noted: 2025-04-09

## 2025-04-21 ENCOUNTER — APPOINTMENT (OUTPATIENT)
Dept: CARDIOLOGY | Facility: CLINIC | Age: 70
End: 2025-04-21
Payer: MEDICARE

## 2025-04-30 ENCOUNTER — OFFICE VISIT (OUTPATIENT)
Dept: CARDIOLOGY | Facility: CLINIC | Age: 70
End: 2025-04-30
Payer: MEDICARE

## 2025-04-30 VITALS
WEIGHT: 187 LBS | BODY MASS INDEX: 25.33 KG/M2 | OXYGEN SATURATION: 98 % | HEART RATE: 52 BPM | SYSTOLIC BLOOD PRESSURE: 126 MMHG | DIASTOLIC BLOOD PRESSURE: 71 MMHG | HEIGHT: 72 IN

## 2025-04-30 DIAGNOSIS — I48.0 PAROXYSMAL ATRIAL FIBRILLATION (MULTI): Chronic | ICD-10-CM

## 2025-04-30 DIAGNOSIS — R07.89 OTHER CHEST PAIN: ICD-10-CM

## 2025-04-30 DIAGNOSIS — Z79.899 HIGH RISK MEDICATION USE: Primary | ICD-10-CM

## 2025-04-30 DIAGNOSIS — R06.02 SHORTNESS OF BREATH: ICD-10-CM

## 2025-04-30 PROCEDURE — 3008F BODY MASS INDEX DOCD: CPT | Performed by: INTERNAL MEDICINE

## 2025-04-30 PROCEDURE — 1159F MED LIST DOCD IN RCRD: CPT | Performed by: INTERNAL MEDICINE

## 2025-04-30 PROCEDURE — 93005 ELECTROCARDIOGRAM TRACING: CPT | Performed by: INTERNAL MEDICINE

## 2025-04-30 PROCEDURE — 3078F DIAST BP <80 MM HG: CPT | Performed by: INTERNAL MEDICINE

## 2025-04-30 PROCEDURE — 1126F AMNT PAIN NOTED NONE PRSNT: CPT | Performed by: INTERNAL MEDICINE

## 2025-04-30 PROCEDURE — 99215 OFFICE O/P EST HI 40 MIN: CPT | Performed by: INTERNAL MEDICINE

## 2025-04-30 PROCEDURE — 1036F TOBACCO NON-USER: CPT | Performed by: INTERNAL MEDICINE

## 2025-04-30 PROCEDURE — 3074F SYST BP LT 130 MM HG: CPT | Performed by: INTERNAL MEDICINE

## 2025-04-30 ASSESSMENT — PAIN SCALES - GENERAL: PAINLEVEL_OUTOF10: 0-NO PAIN

## 2025-04-30 NOTE — ASSESSMENT & PLAN NOTE
The patient described having episodes of chest tightness and shortness of breath on exertion which is a new complaint for him.  He does have an elevated coronary artery calcium score and hyperlipidemia.  An ischemic evaluation is recommended.  A myocardial perfusion stress test will be obtained for ischemic workup, and he will then follow-up with Dr. Zambrano as scheduled.

## 2025-04-30 NOTE — ASSESSMENT & PLAN NOTE
Reji is in sinus rhythm today and doing well on flecainide.  He has had no symptomatic episodes of atrial fibrillation.  We discussed the possibility of catheter ablation if the arrhythmia burden increases on antiarrhythmic drug therapy.  Currently the AF burden is extremely low and we will continue with medical management for now.

## 2025-04-30 NOTE — PROGRESS NOTES
"    History Of Present Illness:      This is a 69-year-old male with atrial fibrillation.  He presents for a follow-up evaluation.  He has had no episodes of atrial fibrillation on flecainide.  However, he describes having episodes of chest tightness and shortness of breath on exertion which is new for him.  He is very active and plays baseball regularly and he is concerned about these symptoms.    Reji was initially seen in consultation August 23, 2024 at the request of Dr. Zambrano.  He was noticing that he was tachycardic and his heart rate would stay elevated after he would exercise.  After he was identified to have atrial fibrillation he was placed on anticoagulation and is now on flecainide 50 mg twice daily.  He states that since taking the flecainide he has been maintaining sinus rhythm.  No side effects related to the use of flecainide and his heart rate has been stable based on his watch recording this.     Past medical history:     Paroxysmal atrial fibrillation  Diabetes mellitus  Hyperlipidemia     Past surgical history:     Cholecystectomy  Knee surgery (ACL reconstruction)  Appendectomy     Social history: Non-smoker     Family history: Negative for premature CAD       Review of Systems  Other review of systems negative  Last Recorded Vitals:      7/9/2024     8:12 AM 8/9/2024     9:42 AM 8/23/2024     8:14 AM 9/11/2024     7:59 AM 11/20/2024     9:15 AM 3/12/2025     9:03 AM 3/12/2025     9:26 AM   Vitals   Systolic 138 120 120 122 118 145 136   Diastolic 90 80 80 76 78 76 87   BP Location Left arm Right arm Left arm  Left arm     Heart Rate 70 60 57 52 55 60    Temp    35.9 °C (96.6 °F)  36.3 °C (97.3 °F)    Height 1.803 m (5' 11\") 1.803 m (5' 11\") 1.803 m (5' 11\") 1.803 m (5' 11\")  1.803 m (5' 11\")    Weight (lb) 188 186 186 184.6 186 192    BMI 26.22 kg/m2 25.94 kg/m2 25.94 kg/m2 25.75 kg/m2 25.94 kg/m2 26.78 kg/m2    BSA (m2) 2.07 m2 2.06 m2 2.06 m2 2.05 m2 2.06 m2 2.09 m2    Visit Report Report " "Report Report Report Report Report Report     Allergies:  Atorvastatin, Penicillins, Adhesive tape-silicones, and Latex  Outpatient Medications:  Current Outpatient Medications   Medication Instructions    apixaban (ELIQUIS) 5 mg, oral, 2 times daily    B complex-vitamin C-folic acid (Nephro-Javed) 0.8 mg tablet 0.8 mg, Daily    Farxiga 10 mg TAKE ONE TABLET BY MOUTH EVERY MORNING. TAKE WITH OR WITHOUT FOOD    flecainide (TAMBOCOR) 50 mg, oral, 2 times daily    Mag Glycinate 500 mg    metFORMIN (GLUCOPHAGE) 500 mg, oral, 2 times daily    metoprolol succinate XL (TOPROL-XL) 25 mg, oral, Daily, Do not crush or chew.    omega 3-dha-epa-fish oil (Fish OiL) 1,200 (144-216) mg capsule     rosuvastatin (CRESTOR) 10 mg, oral, Nightly       Physical Exam:    General Appearance:  Alert, oriented, no distress  Skin:  Warm and dry  Head and Neck:  No elevation of JVP, no carotid bruits  Cardiac Exam:  Rhythm is regular, S1 and S2 are normal, no murmur S3 or S4  Lungs:  Clear to auscultation  Extremities:  no edema  Neurologic:  No focal deficits  Psychiatric:  Appropriate mood and behavior    Lab Results:    CMP:  Recent Labs     07/15/22  0809 08/09/21  0922    138   K 4.1 3.9    101   CO2 29 27   ANIONGAP 11 14   BUN 22 24*   CREATININE 0.84 1.01     Recent Labs     07/15/22  0809 08/09/21  0922   ALBUMIN 4.1 4.6   ALKPHOS 86 96   ALT 18 17   AST 13 15   BILITOT 0.7 1.0     CBC:  Recent Labs     08/09/21  0922   WBC 6.0   HGB 15.4   HCT 46.1      MCV 92     COAG: No results for input(s): \"PTT\", \"INR\", \"HAUF\", \"DDIMERVTE\", \"HAPTOGLOBIN\", \"FIBRINOGEN\" in the last 35438 hours.  Cardiology Tests (personally reviewed):    Stress test Sept 2021:  No ischemia  Holter July 2024:  NSR with PAF/RVR, 10% AF burden    Assessment/Plan   Problem List Items Addressed This Visit           ICD-10-CM    Paroxysmal atrial fibrillation (Multi) (Chronic) I48.0    Reji is in sinus rhythm today and doing well on flecainide.  He " has had no symptomatic episodes of atrial fibrillation.  We discussed the possibility of catheter ablation if the arrhythmia burden increases on antiarrhythmic drug therapy.  Currently the AF burden is extremely low and we will continue with medical management for now.         Relevant Orders    ECG 12 lead (Clinic Performed)    Nuclear Stress Test    High risk medication use - Primary Z79.899    No bleeding problems on Eliquis.  QRS duration has been stable on current dose of flecainide.         Other chest pain R07.89    The patient described having episodes of chest tightness and shortness of breath on exertion which is a new complaint for him.  He does have an elevated coronary artery calcium score and hyperlipidemia.  An ischemic evaluation is recommended.  A myocardial perfusion stress test will be obtained for ischemic workup, and he will then follow-up with Dr. Zambrano as scheduled.         Relevant Orders    Nuclear Stress Test     Other Visit Diagnoses         Codes      Shortness of breath     R06.02    Relevant Orders    Nuclear Stress Test          James C Ramicone, DO

## 2025-05-01 LAB
ATRIAL RATE: 59 BPM
P AXIS: 56 DEGREES
P OFFSET: 175 MS
P ONSET: 129 MS
PR INTERVAL: 190 MS
Q ONSET: 224 MS
QRS COUNT: 9 BEATS
QRS DURATION: 88 MS
QT INTERVAL: 456 MS
QTC CALCULATION(BAZETT): 451 MS
QTC FREDERICIA: 453 MS
R AXIS: -8 DEGREES
T AXIS: 38 DEGREES
T OFFSET: 452 MS
VENTRICULAR RATE: 59 BPM

## 2025-05-20 ENCOUNTER — HOSPITAL ENCOUNTER (OUTPATIENT)
Dept: RADIOLOGY | Facility: HOSPITAL | Age: 70
Discharge: HOME | End: 2025-05-20
Payer: MEDICARE

## 2025-05-20 ENCOUNTER — HOSPITAL ENCOUNTER (OUTPATIENT)
Dept: CARDIOLOGY | Facility: HOSPITAL | Age: 70
Discharge: HOME | End: 2025-05-20
Payer: MEDICARE

## 2025-05-20 DIAGNOSIS — R06.02 SHORTNESS OF BREATH: ICD-10-CM

## 2025-05-20 DIAGNOSIS — R07.89 OTHER CHEST PAIN: ICD-10-CM

## 2025-05-20 DIAGNOSIS — I48.0 PAROXYSMAL ATRIAL FIBRILLATION (MULTI): Chronic | ICD-10-CM

## 2025-05-20 PROCEDURE — 93017 CV STRESS TEST TRACING ONLY: CPT

## 2025-05-20 PROCEDURE — 93018 CV STRESS TEST I&R ONLY: CPT | Performed by: INTERNAL MEDICINE

## 2025-05-20 PROCEDURE — 3430000001 HC RX 343 DIAGNOSTIC RADIOPHARMACEUTICALS: Performed by: INTERNAL MEDICINE

## 2025-05-20 PROCEDURE — 93016 CV STRESS TEST SUPVJ ONLY: CPT | Performed by: INTERNAL MEDICINE

## 2025-05-20 PROCEDURE — A9502 TC99M TETROFOSMIN: HCPCS | Performed by: INTERNAL MEDICINE

## 2025-05-20 PROCEDURE — 78452 HT MUSCLE IMAGE SPECT MULT: CPT

## 2025-05-20 PROCEDURE — 78452 HT MUSCLE IMAGE SPECT MULT: CPT | Performed by: STUDENT IN AN ORGANIZED HEALTH CARE EDUCATION/TRAINING PROGRAM

## 2025-05-20 RX ADMIN — TETROFOSMIN 10.6 MILLICURIE: 0.23 INJECTION, POWDER, LYOPHILIZED, FOR SOLUTION INTRAVENOUS at 10:05

## 2025-05-20 RX ADMIN — TETROFOSMIN 35.8 MILLICURIE: 0.23 INJECTION, POWDER, LYOPHILIZED, FOR SOLUTION INTRAVENOUS at 11:11

## 2025-05-21 ENCOUNTER — APPOINTMENT (OUTPATIENT)
Dept: CARDIOLOGY | Facility: CLINIC | Age: 70
End: 2025-05-21
Payer: MEDICARE

## 2025-05-23 ENCOUNTER — APPOINTMENT (OUTPATIENT)
Dept: CARDIOLOGY | Facility: CLINIC | Age: 70
End: 2025-05-23
Payer: MEDICARE

## 2025-05-23 ENCOUNTER — APPOINTMENT (OUTPATIENT)
Dept: RADIOLOGY | Facility: CLINIC | Age: 70
End: 2025-05-23
Payer: MEDICARE

## 2025-05-29 ENCOUNTER — APPOINTMENT (OUTPATIENT)
Dept: CARDIOLOGY | Facility: CLINIC | Age: 70
End: 2025-05-29
Payer: MEDICARE

## 2025-05-29 VITALS
HEART RATE: 58 BPM | DIASTOLIC BLOOD PRESSURE: 74 MMHG | HEIGHT: 72 IN | WEIGHT: 187 LBS | BODY MASS INDEX: 25.33 KG/M2 | SYSTOLIC BLOOD PRESSURE: 118 MMHG | OXYGEN SATURATION: 97 %

## 2025-05-29 DIAGNOSIS — R07.89 OTHER CHEST PAIN: ICD-10-CM

## 2025-05-29 DIAGNOSIS — E78.2 MIXED HYPERLIPIDEMIA: ICD-10-CM

## 2025-05-29 DIAGNOSIS — I48.0 PAROXYSMAL ATRIAL FIBRILLATION (MULTI): Chronic | ICD-10-CM

## 2025-05-29 DIAGNOSIS — R03.0 ELEVATED BLOOD PRESSURE READING WITHOUT DIAGNOSIS OF HYPERTENSION: ICD-10-CM

## 2025-05-29 DIAGNOSIS — I25.10 CORONARY ARTERY DISEASE INVOLVING NATIVE CORONARY ARTERY OF NATIVE HEART WITHOUT ANGINA PECTORIS: Primary | ICD-10-CM

## 2025-05-29 PROCEDURE — 99214 OFFICE O/P EST MOD 30 MIN: CPT | Performed by: INTERNAL MEDICINE

## 2025-05-29 PROCEDURE — 1159F MED LIST DOCD IN RCRD: CPT | Performed by: INTERNAL MEDICINE

## 2025-05-29 PROCEDURE — 99213 OFFICE O/P EST LOW 20 MIN: CPT | Performed by: INTERNAL MEDICINE

## 2025-05-29 PROCEDURE — 3074F SYST BP LT 130 MM HG: CPT | Performed by: INTERNAL MEDICINE

## 2025-05-29 PROCEDURE — 3008F BODY MASS INDEX DOCD: CPT | Performed by: INTERNAL MEDICINE

## 2025-05-29 PROCEDURE — 3078F DIAST BP <80 MM HG: CPT | Performed by: INTERNAL MEDICINE

## 2025-05-29 RX ORDER — ROSUVASTATIN CALCIUM 20 MG/1
20 TABLET, COATED ORAL NIGHTLY
Qty: 90 TABLET | Refills: 3 | Status: SHIPPED | OUTPATIENT
Start: 2025-05-29 | End: 2026-05-29

## 2025-05-29 NOTE — PROGRESS NOTES
Subjective   Reji Ingram is a 69 y.o. male.    Chief Complaint:  Chest tightness.    HPI    He is here for follow-up of his coronary artery disease.  This is a routine 6-month follow-up.  Also followed by the electrophysiology service for paroxysmal atrial fibrillation.  He notes brief episodes of chest tightness.  In general lasts for less than 1 minute.  Not related to activities.  Occurs only at rest.  Not associate with any other symptoms.  He exercises heavily.  He walks and jogs up to 2 to 3 miles per day.  Does not get any symptoms with activities.  Recently completed a stress thallium study.  He is here for follow-up of the results.    He has diagnosis of coronary artery disease is based on a positive CT calcium score of 1534.  This study was done on 2024.       He had a stress echo study in  which was normal.     Past medical history: Significant for diabetes and hyperlipidemia.     Past surgical history: Cholecystectomy knee surgery and appendectomy.     Social history: He is a non-smoker and has never smoked.  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     Family history: Father  young of accidental trauma.  Mother had multiple problems including severe rheumatoid arthritis.     Review of Systems   Cardiovascular:  Positive for chest pain.   Musculoskeletal:  Positive for arthritis and joint pain.   All other systems reviewed and are negative.      Current Medications[1]     Visit Vitals  /74 (BP Location: Left arm)   Pulse 58   Ht 1.829 m (6')   Wt 84.8 kg (187 lb)   SpO2 97%   BMI 25.36 kg/m²   Smoking Status Never   BSA 2.08 m²        Objective     Constitutional:       Appearance: Not in distress.   Neck:      Vascular: JVD normal.   Pulmonary:      Breath sounds: Normal breath sounds.   Cardiovascular:      Normal rate. Regular rhythm. S1 with normal intensity. S2 with normal intensity.       Murmurs: There is no murmur.      No gallop.    Pulses:     Intact distal  pulses.   Edema:     Peripheral edema absent.   Abdominal:      General: Bowel sounds are normal.   Neurological:      Mental Status: Alert and oriented to person, place and time.         Lab Review:   Lab Results   Component Value Date     07/15/2022    K 4.1 07/15/2022     07/15/2022    CO2 29 07/15/2022    BUN 22 07/15/2022    CREATININE 0.84 07/15/2022    GLUCOSE 223 (H) 07/15/2022    CALCIUM 9.5 07/15/2022       Assessment:    1.  Coronary artery disease.  Extensive coronary artery disease on the basis of coronary CT calcium scoring.  The patient's stress thallium studies were reviewed.  He was able to exercise into stage VI of the Jose protocol with no EKG changes.  His nuclear stress imaging study showed no ischemia with an ejection fraction of 55%.  Since he only gets symptoms at rest and they are brief, we will plan continue medical therapy.  Should he develop symptoms with exercise it would change our approach.  Do not think that he would be a good candidate for coronary CT angiography because of the extensive nature of his calcification.  However this could possibly be an option for the future.    2.  Hyperlipidemia.  When I get his lipid profiles down to an LDL 50.  Increase rosuvastatin to 20 mg daily.  He is on anticoagulation therapy.  Will have him get blood work prior to his office visit with his primary care provider.    3.  Paroxysmal atrial fibrillation.  Continues to maintain sinus rhythm.  Should he develop atrial fibrillation in the future, he would be a candidate for a pulmonary vein isolation procedure.         [1]   Current Outpatient Medications   Medication Sig Dispense Refill    apixaban (Eliquis) 5 mg tablet Take 1 tablet (5 mg) by mouth 2 times a day. 60 tablet 11    B complex-vitamin C-folic acid (Nephro-Javed) 0.8 mg tablet Take 1 tablet by mouth once daily.      Farxiga 10 mg TAKE ONE TABLET BY MOUTH EVERY MORNING. TAKE WITH OR WITHOUT FOOD 90 tablet 2    flecainide  (Tambocor) 50 mg tablet Take 1 tablet (50 mg) by mouth 2 times a day. 180 tablet 3    magnesium glycinate (Mag Glycinate) 100 mg tablet Take 500 mg by mouth.      metFORMIN (Glucophage) 500 mg tablet TAKE ONE TABLET BY MOUTH TWO TIMES A  tablet 2    metoprolol succinate XL (Toprol-XL) 25 mg 24 hr tablet Take 1 tablet (25 mg) by mouth once daily. Do not crush or chew. 90 tablet 3    omega 3-dha-epa-fish oil (Fish OiL) 1,200 (144-216) mg capsule       rosuvastatin (Crestor) 20 mg tablet Take 1 tablet (20 mg) by mouth once daily at bedtime. 90 tablet 3     No current facility-administered medications for this visit.

## 2025-05-29 NOTE — PATIENT INSTRUCTIONS
Increase the rosuvastatin to 20 mg daily.    Get blood tests in 2-3 months before you see Leonora Trejo CNP    You did excellent on your stress test and your heart scans are normal.    If you develop symptoms while exercising, call us.

## 2025-07-08 DIAGNOSIS — I48.92 PAROXYSMAL ATRIAL FLUTTER (MULTI): Primary | ICD-10-CM

## 2025-07-10 RX ORDER — METOPROLOL SUCCINATE 25 MG/1
25 TABLET, EXTENDED RELEASE ORAL DAILY
Qty: 90 TABLET | Refills: 3 | Status: SHIPPED | OUTPATIENT
Start: 2025-07-10

## 2025-07-22 DIAGNOSIS — I48.92 PAROXYSMAL ATRIAL FLUTTER (MULTI): ICD-10-CM

## 2025-07-23 RX ORDER — APIXABAN 5 MG/1
5 TABLET, FILM COATED ORAL 2 TIMES DAILY
Qty: 60 TABLET | Refills: 11 | Status: SHIPPED | OUTPATIENT
Start: 2025-07-23

## 2025-08-18 DIAGNOSIS — I48.0 PAROXYSMAL ATRIAL FIBRILLATION (MULTI): Primary | ICD-10-CM

## 2025-08-19 RX ORDER — FLECAINIDE ACETATE 50 MG/1
50 TABLET ORAL 2 TIMES DAILY
Qty: 180 TABLET | Refills: 3 | Status: SHIPPED | OUTPATIENT
Start: 2025-08-19

## 2025-09-16 ENCOUNTER — APPOINTMENT (OUTPATIENT)
Dept: PRIMARY CARE | Facility: CLINIC | Age: 70
End: 2025-09-16
Payer: MEDICARE